# Patient Record
Sex: FEMALE | Race: WHITE | NOT HISPANIC OR LATINO | Employment: FULL TIME | URBAN - METROPOLITAN AREA
[De-identification: names, ages, dates, MRNs, and addresses within clinical notes are randomized per-mention and may not be internally consistent; named-entity substitution may affect disease eponyms.]

---

## 2017-08-01 ENCOUNTER — TELEPHONE (OUTPATIENT)
Dept: SURGERY | Facility: CLINIC | Age: 67
End: 2017-08-01

## 2017-08-01 NOTE — TELEPHONE ENCOUNTER
----- Message from Alfonso Alanis sent at 8/1/2017  4:17 PM CDT -----  [phone]//pt states that she needs to speak with nurse in ref to her prior mammo//please call/thank you

## 2017-08-01 NOTE — TELEPHONE ENCOUNTER
Returned the patient's call regarding the message below. All of the patient's questions were answered.  The patient stated she will call back to schedule her breast exam & mammogram appointments.

## 2017-08-07 DIAGNOSIS — N60.19 FIBROCYSTIC BREAST CHANGES, UNSPECIFIED LATERALITY: Primary | ICD-10-CM

## 2017-11-20 ENCOUNTER — OFFICE VISIT (OUTPATIENT)
Dept: SURGERY | Facility: CLINIC | Age: 67
End: 2017-11-20
Payer: COMMERCIAL

## 2017-11-20 ENCOUNTER — HOSPITAL ENCOUNTER (OUTPATIENT)
Dept: RADIOLOGY | Facility: HOSPITAL | Age: 67
Discharge: HOME | End: 2017-11-20
Attending: NURSE PRACTITIONER
Payer: COMMERCIAL

## 2017-11-20 VITALS
HEART RATE: 69 BPM | SYSTOLIC BLOOD PRESSURE: 173 MMHG | BODY MASS INDEX: 22.88 KG/M2 | DIASTOLIC BLOOD PRESSURE: 81 MMHG | HEIGHT: 64 IN | WEIGHT: 134 LBS | TEMPERATURE: 98 F

## 2017-11-20 DIAGNOSIS — B37.89 CANDIDIASIS OF BREAST: Primary | ICD-10-CM

## 2017-11-20 DIAGNOSIS — Z80.3 FAMILY HISTORY OF BREAST CANCER: ICD-10-CM

## 2017-11-20 DIAGNOSIS — N60.19 FIBROCYSTIC BREAST CHANGES, UNSPECIFIED LATERALITY: ICD-10-CM

## 2017-11-20 DIAGNOSIS — Z12.39 BREAST CANCER SCREENING, HIGH RISK PATIENT: ICD-10-CM

## 2017-11-20 PROCEDURE — 77062 BREAST TOMOSYNTHESIS BI: CPT | Mod: 26,,, | Performed by: RADIOLOGY

## 2017-11-20 PROCEDURE — 77066 DX MAMMO INCL CAD BI: CPT | Mod: TC

## 2017-11-20 PROCEDURE — 99212 OFFICE O/P EST SF 10 MIN: CPT | Mod: S$GLB,,, | Performed by: NURSE PRACTITIONER

## 2017-11-20 PROCEDURE — 77066 DX MAMMO INCL CAD BI: CPT | Mod: 26,,, | Performed by: RADIOLOGY

## 2017-11-20 PROCEDURE — 99999 PR PBB SHADOW E&M-EST. PATIENT-LVL III: CPT | Mod: PBBFAC,,, | Performed by: NURSE PRACTITIONER

## 2017-11-20 RX ORDER — NYSTATIN 100000 U/G
CREAM TOPICAL 2 TIMES DAILY
Qty: 30 G | Refills: 3 | Status: SHIPPED | OUTPATIENT
Start: 2017-11-20 | End: 2018-10-03

## 2017-11-20 NOTE — PROGRESS NOTES
Subjective:      Patient ID: Stephanie Mello is a 67 y.o. female.    Chief Complaint: Breast Cancer Screening      HPI: (PF, EPF - 1-3) (Detailed, Comp, - 4) returning patient presents for breast cancer screening, strong family history of breast cancer. Patient denies palpable breast mass, pain, nipple discharge, redness, increased warmth. Strong family history of breast cancer, suggestive of family clustering verses unknown genetic predisposition (negative genetic testing in affected relatives). She reports intermittent itchy rash under her breasts, primarily when it is warm outside    Last mmg 8/10/2016     Review of Systems  Objective:   Physical Exam   Pulmonary/Chest: She exhibits no mass, no tenderness, no laceration, no edema and no swelling. Right breast exhibits no inverted nipple, no mass, no nipple discharge, no skin change and no tenderness. Left breast exhibits no inverted nipple, no mass, no nipple discharge, no skin change and no tenderness.   Lymphadenopathy:     She has no cervical adenopathy.     She has no axillary adenopathy.        Right: No supraclavicular adenopathy present.        Left: No supraclavicular adenopathy present.     Assessment:       1. Candidiasis of breast    2. Family history of breast cancer    3. Breast cancer screening, high risk patient        Plan:       mmg today, no changes or abnormality reported   Return in 6 months CBE, also discussed MRI as additional screening, last MRI 2012, she will consider and let me know at 6 months whether she desires to proceed with this additional imaging   Call for any interval palpable breast mass, pain, nipple discharge, skin changes or other breast related concerns

## 2018-10-03 ENCOUNTER — OFFICE VISIT (OUTPATIENT)
Dept: URGENT CARE | Facility: CLINIC | Age: 68
End: 2018-10-03
Payer: MEDICARE

## 2018-10-03 VITALS
WEIGHT: 135 LBS | OXYGEN SATURATION: 99 % | TEMPERATURE: 100 F | RESPIRATION RATE: 18 BRPM | SYSTOLIC BLOOD PRESSURE: 153 MMHG | BODY MASS INDEX: 23.05 KG/M2 | HEART RATE: 78 BPM | DIASTOLIC BLOOD PRESSURE: 91 MMHG | HEIGHT: 64 IN

## 2018-10-03 DIAGNOSIS — L04.9 LYMPHADENITIS, ACUTE: Primary | ICD-10-CM

## 2018-10-03 PROCEDURE — 3077F SYST BP >= 140 MM HG: CPT | Mod: CPTII,S$GLB,, | Performed by: EMERGENCY MEDICINE

## 2018-10-03 PROCEDURE — 99203 OFFICE O/P NEW LOW 30 MIN: CPT | Mod: S$GLB,,, | Performed by: EMERGENCY MEDICINE

## 2018-10-03 PROCEDURE — 1101F PT FALLS ASSESS-DOCD LE1/YR: CPT | Mod: CPTII,S$GLB,, | Performed by: EMERGENCY MEDICINE

## 2018-10-03 PROCEDURE — 3080F DIAST BP >= 90 MM HG: CPT | Mod: CPTII,S$GLB,, | Performed by: EMERGENCY MEDICINE

## 2018-10-03 RX ORDER — AMOXICILLIN 500 MG/1
500 CAPSULE ORAL EVERY 12 HOURS
Qty: 14 CAPSULE | Refills: 0 | Status: SHIPPED | OUTPATIENT
Start: 2018-10-03 | End: 2018-10-10

## 2018-10-03 NOTE — PROGRESS NOTES
"Subjective:       Patient ID: Stephanie Mello is a 68 y.o. female.    Vitals:  height is 5' 4" (1.626 m) and weight is 61.2 kg (135 lb). Her oral temperature is 99.7 °F (37.6 °C). Her blood pressure is 153/91 (abnormal) and her pulse is 78. Her respiration is 18 and oxygen saturation is 99%.     Chief Complaint: Jaw Pain    Patient to clinic complaining of left sided jaw pain for 3 days. She rates her pain at 2 out of 10. She states the pain has gotten a little better but the swelling is worse. She also states she can not chew on her left side. Patient denies any previous injury of trauma.      Review of Systems   Constitution: Negative for chills and fever.   HENT: Negative for sore throat.    Eyes: Negative for blurred vision.   Cardiovascular: Negative for chest pain.   Respiratory: Negative for shortness of breath.    Skin: Negative for rash.   Musculoskeletal: Negative for back pain and joint pain.   Gastrointestinal: Negative for abdominal pain, diarrhea, nausea and vomiting.   Neurological: Negative for headaches.   Psychiatric/Behavioral: The patient is not nervous/anxious.        Objective:      Physical Exam   Constitutional: She is oriented to person, place, and time. She appears well-developed and well-nourished.   HENT:   Head: Normocephalic and atraumatic. Head is without abrasion, without contusion and without laceration.   Right Ear: External ear normal.   Left Ear: External ear normal.   Nose: Nose normal.   Mouth/Throat: Uvula is midline, oropharynx is clear and moist and mucous membranes are normal. There is trismus (mild) in the jaw.   Eyes: Conjunctivae, EOM and lids are normal. Pupils are equal, round, and reactive to light.   Neck: Trachea normal, full passive range of motion without pain and phonation normal. Neck supple.   Cardiovascular: Normal rate, regular rhythm and normal heart sounds.   Pulmonary/Chest: Effort normal and breath sounds normal. No stridor. No respiratory " distress.   Musculoskeletal: Normal range of motion.   Lymphadenopathy:        Head (left side): Submandibular adenopathy present.   Neurological: She is alert and oriented to person, place, and time.   Skin: Skin is warm, dry and intact. Capillary refill takes less than 2 seconds. No abrasion, no bruising, no burn, no ecchymosis, no laceration, no lesion and no rash noted. No erythema.   Psychiatric: She has a normal mood and affect. Her speech is normal and behavior is normal. Judgment and thought content normal. Cognition and memory are normal.   Nursing note and vitals reviewed.      Assessment:       1. Lymphadenitis, acute        Plan:         Lymphadenitis, acute  -     amoxicillin (AMOXIL) 500 MG capsule; Take 1 capsule (500 mg total) by mouth every 12 (twelve) hours. for 7 days  Dispense: 14 capsule; Refill: 0

## 2018-10-03 NOTE — PATIENT INSTRUCTIONS
Please return here or go to the Emergency Department for any concerns or worsening of condition.  If you were prescribed antibiotics, please take them to completion.  If you were prescribed a narcotic medication, do not drive or operate heavy equipment or machinery while taking these medications.  Please follow up with your primary care doctor or specialist as needed.  If you  smoke, please stop smoking.      Local Lymph Node Infection, Antibiotic Treatment    You have a bacterial infection of a lymph node. The lymph nodes are part of your immune system. They are found under the jaw and along the side of the neck, in the armpits, and in the groin. An infection or inflammation in nearby tissues causes the lymph nodes to swell and become tender.  When a bacterial infection occurs in the lymph node, it becomes very painful. The nearby skin gets red and warm. You may also have a fever.  Antibiotics and hot compresses are used to treat this infection. The pain and redness will get better over the next 7 to 10 days. Swelling may take several months to go away.  Sometimes an abscess (with pus) forms inside the lymph node. If this happens, antibiotics may not be enough to cure the infection. Minor surgery may be needed to drain the pus.  Home care  Follow these guidelines when caring for yourself at home:  · Take all of the antibiotic medicine exactly as prescribed until it is gone. Be careful not to miss any doses, especially during the first few days.  · Make a hot compress by running hot water over a face cloth. Apply it to the sore area until the cloth cools off. Repeat this for 20 minutes. Use the hot compress 3 times a day for the first 3 days, or until the pain and redness begin to get better. The heat will increase the blood flow to the area and speed the healing process.  · You may use acetaminophen or ibuprofen to control pain and fever, unless another medicine was prescribed for this. Dont use ibuprofen in  children under 6 months of age. If you have chronic liver or kidney disease, talk with your healthcare provider before using these medicines. Also talk with your provider if youve had a stomach ulcer or GI bleeding. Dont give aspirin to anyone under 18 years of age who is ill with a fever. It may cause severe liver damage.  Follow-up care  Follow up with your healthcare provider, or as advised, after you finish the antibiotics.  When to seek medical advice  Call your healthcare provider right away if any of these occur:  · Redness, swelling or pain in the lymph node gets worse  · The lymph node gets bigger, becomes soft in the middle, or doesnt seem to be getting better after youve taken the antibiotics for 2 days (48 hours)  · Pus or fluid drains from the lymph node  · You have difficulty breathing or swallowing  Also call your provider right away if you have a fever, or your childs provider if:  · Your child is younger than 12 weeks and has a fever of 100.4°F (38°C) or higher. Your baby may need to be seen by his or her healthcare provider.  · Your child is of any age and has repeated fevers above 104°F (40°C).  · Your child is younger than 2 years old and his or her fever continues for more than 24 hours. Or your child is 2 years old or older and his or her fever continues for more than 3 days.  Date Last Reviewed: 2/17/2015  © 2280-8978 [employer]. [address] [place] [address]. All rights reserved. This information is not intended as a substitute for professional medical care. Always follow your healthcare professional's instructions.

## 2018-12-03 ENCOUNTER — TELEPHONE (OUTPATIENT)
Dept: UROGYNECOLOGY | Facility: CLINIC | Age: 68
End: 2018-12-03

## 2018-12-03 ENCOUNTER — OFFICE VISIT (OUTPATIENT)
Dept: SURGERY | Facility: CLINIC | Age: 68
End: 2018-12-03
Payer: MEDICARE

## 2018-12-03 VITALS
HEART RATE: 66 BPM | DIASTOLIC BLOOD PRESSURE: 90 MMHG | WEIGHT: 136.81 LBS | HEIGHT: 64 IN | TEMPERATURE: 98 F | SYSTOLIC BLOOD PRESSURE: 166 MMHG | BODY MASS INDEX: 23.36 KG/M2

## 2018-12-03 DIAGNOSIS — Z80.3 FAMILY HISTORY OF BREAST CANCER: Primary | ICD-10-CM

## 2018-12-03 DIAGNOSIS — Z12.39 BREAST CANCER SCREENING, HIGH RISK PATIENT: ICD-10-CM

## 2018-12-03 PROCEDURE — 3077F SYST BP >= 140 MM HG: CPT | Mod: CPTII,S$GLB,, | Performed by: NURSE PRACTITIONER

## 2018-12-03 PROCEDURE — 99212 OFFICE O/P EST SF 10 MIN: CPT | Mod: S$GLB,,, | Performed by: NURSE PRACTITIONER

## 2018-12-03 PROCEDURE — 3080F DIAST BP >= 90 MM HG: CPT | Mod: CPTII,S$GLB,, | Performed by: NURSE PRACTITIONER

## 2018-12-03 PROCEDURE — 99999 PR PBB SHADOW E&M-EST. PATIENT-LVL IV: CPT | Mod: PBBFAC,,, | Performed by: NURSE PRACTITIONER

## 2018-12-03 PROCEDURE — 1101F PT FALLS ASSESS-DOCD LE1/YR: CPT | Mod: CPTII,S$GLB,, | Performed by: NURSE PRACTITIONER

## 2018-12-03 NOTE — PROGRESS NOTES
Subjective:      Patient ID: Stephanie Mello is a 68 y.o. female.    Chief Complaint: Breast Cancer Screening (CBE)      HPI: (PF, EPF - 1-3) (Detailed, Comp, - 4)returning patient presents for breast cancer screening. Patient denies palpable breast mass, pain, nipple discharge, redness, increased warmth.   Strong family history of breast cancer, suggestive of family clustering verses unknown genetic predisposition (negative genetic testing in affected relatives).      Last mmg 11- with no changes reported, breasts heterogeneously dense      Review of Systems  Objective:   Physical Exam   Pulmonary/Chest: Right breast exhibits no inverted nipple, no mass, no nipple discharge, no skin change and no tenderness. Left breast exhibits no inverted nipple, no mass, no nipple discharge, no skin change and no tenderness. Breasts are symmetrical. There is no breast swelling.   Lymphadenopathy:     She has no cervical adenopathy.     She has no axillary adenopathy.        Right: No supraclavicular adenopathy present.        Left: No supraclavicular adenopathy present.     Assessment:       1. Family history of breast cancer    2. Breast cancer screening, high risk patient        Plan:       Patient cannot have mmg today secondary to her schedule, will be scheduled and if no changes or abnormality she can return in one year to see Gallo Perez NP or another member of the breast team. Informed her I will be leaving [facility] next week.   She does not desire to have breast MRI as additional screening

## 2018-12-03 NOTE — TELEPHONE ENCOUNTER
What is the request in detail: requesting a call back in reference to seeing Dr Small in regards to a bulge, similar to a hemorhoid in her vaginal area.Pt would like to get a sooner appt than the 1/24 that I was able to schedule her for. Please call and advise             L/M stating we do not have available appts. Before 01/24/2019, she can call me back, if she has questions.

## 2019-01-24 ENCOUNTER — INITIAL CONSULT (OUTPATIENT)
Dept: UROGYNECOLOGY | Facility: CLINIC | Age: 69
End: 2019-01-24
Payer: MEDICARE

## 2019-01-24 VITALS
HEIGHT: 64 IN | SYSTOLIC BLOOD PRESSURE: 152 MMHG | BODY MASS INDEX: 24.28 KG/M2 | DIASTOLIC BLOOD PRESSURE: 80 MMHG | WEIGHT: 142.19 LBS

## 2019-01-24 DIAGNOSIS — N81.6 RECTOCELE, FEMALE: ICD-10-CM

## 2019-01-24 DIAGNOSIS — N81.9 VAGINAL VAULT PROLAPSE: ICD-10-CM

## 2019-01-24 DIAGNOSIS — N81.11 CYSTOCELE, MIDLINE: ICD-10-CM

## 2019-01-24 DIAGNOSIS — R30.0 DYSURIA: ICD-10-CM

## 2019-01-24 DIAGNOSIS — Z12.31 ENCOUNTER FOR SCREENING MAMMOGRAM FOR BREAST CANCER: Primary | ICD-10-CM

## 2019-01-24 PROCEDURE — 51701 INSERT BLADDER CATHETER: CPT | Mod: S$GLB,,, | Performed by: OBSTETRICS & GYNECOLOGY

## 2019-01-24 PROCEDURE — 99205 OFFICE O/P NEW HI 60 MIN: CPT | Mod: 25,S$GLB,, | Performed by: OBSTETRICS & GYNECOLOGY

## 2019-01-24 PROCEDURE — 99999 PR PBB SHADOW E&M-EST. PATIENT-LVL III: ICD-10-PCS | Mod: PBBFAC,,, | Performed by: OBSTETRICS & GYNECOLOGY

## 2019-01-24 PROCEDURE — 51701 PR INSERTION OF NON-INDWELLING BLADDER CATHETERIZATION FOR RESIDUAL UR: ICD-10-PCS | Mod: S$GLB,,, | Performed by: OBSTETRICS & GYNECOLOGY

## 2019-01-24 PROCEDURE — 1101F PT FALLS ASSESS-DOCD LE1/YR: CPT | Mod: CPTII,S$GLB,, | Performed by: OBSTETRICS & GYNECOLOGY

## 2019-01-24 PROCEDURE — 99205 PR OFFICE/OUTPT VISIT, NEW, LEVL V, 60-74 MIN: ICD-10-PCS | Mod: 25,S$GLB,, | Performed by: OBSTETRICS & GYNECOLOGY

## 2019-01-24 PROCEDURE — 87086 URINE CULTURE/COLONY COUNT: CPT

## 2019-01-24 PROCEDURE — 1101F PR PT FALLS ASSESS DOC 0-1 FALLS W/OUT INJ PAST YR: ICD-10-PCS | Mod: CPTII,S$GLB,, | Performed by: OBSTETRICS & GYNECOLOGY

## 2019-01-24 PROCEDURE — 99999 PR PBB SHADOW E&M-EST. PATIENT-LVL III: CPT | Mod: PBBFAC,,, | Performed by: OBSTETRICS & GYNECOLOGY

## 2019-01-24 NOTE — PROGRESS NOTES
[facility] [address] [place] [address]    Stephanie Mello  [id number]  1950    Consulting Physician: Jacki Parada FNP,*   GYN: none; Gabino in the past; PCP was doing paps  Primary M.D.: Jerrica Landa MD    Chief Complaint   Patient presents with    Vaginal Prolapse       HPI:     1)  UI:  (--) CUCA = (--) UUI.  (--) pads. Not getting up to urinate nightly.  Daytime frequency: Q 2 hours.  Nocturia: rarely:   (--) dysuria,  (--) hematuria,  (--) frequent UTIs.  (+) complete bladder emptying: rarely feels residual volume.    2)  POP:  Present. above introitus.  Symptoms:(--)  Discovered it about two months ago incidentally while bathing.  (--) vaginal bleeding. (--) vaginal discharge. (+) sexually active.  (--) dyspareunia.   (--)  Vaginal dryness.  (--) vaginal estrogen use.     3)  BM:  (--) constipation/straining.  (--) chronic diarrhea. (--) hematochezia.  (--) fecal incontinence.  (--) fecal smearing/urgency.  (+) complete evacuation.      Past Medical History  Past Medical History:   Diagnosis Date    Hyperlipidemia         Past Surgical History  Past Surgical History:   Procedure Laterality Date    APPENDECTOMY      HYSTERECTOMY      partial hysterectomy, then ovaries removed in     OOPHORECTOMY      TONSILLECTOMY      TUBAL LIGATION      xlap/pfannenstiel BSO (pelvic pain)    Hysterectomy: Yes -   Date: .  Indication: Chronic, heavy bleeding, was placed on provera but it was not helping.  Type: laparoscopic  Cervix present: No  Ovaries present: No--removed later through xlap  Other procedures at time of hysterectomy: Rectocele repair    Past Ob History     x N/A.  C/s x N/A.      Gynecologic History  LMP: No LMP recorded. Patient has had a hysterectomy.  Age of menarche: 13  Age of menopause: N/A (hysterectomy)  Menstrual history: regular timing, prolonged, heavy bleeding.  Pap test: N/A.   History of abnormal paps: No.  History of STIs:  No  Mammogram: Date of last: 11/17.  Result: Normal  Colonoscopy: Date of last: 2010.  Result:  Normal.  Repeat due:  2020.    DEXA:  Date of last: 2012.  Result:  Osteopenia. Declined meds.  Taking Ca/D.  Repeat due:  Not scheduled.     Family History  Family History   Problem Relation Age of Onset    Breast cancer Other 40    Breast cancer Mother 65    Breast cancer Sister 41    Breast cancer Sister 50    Ovarian cancer Neg Hx       Colon CA: No  Breast CA: Yes - Mother and two sisters  GYN CA: No   CA: No    Social History  Social History     Tobacco Use   Smoking Status Never Smoker   Smokeless Tobacco Never Used     Social History     Substance and Sexual Activity   Alcohol Use Yes    Alcohol/week: 1.5 oz    Types: 3 Standard drinks or equivalent per week   .    Social History     Substance and Sexual Activity   Drug Use No     The patient is .  Resides in [place] [address].  Employment status: retired.    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      Allergies  Review of patient's allergies indicates:   Allergen Reactions    Cephalosporins Diarrhea    Influenza virus vaccines Other (See Comments)     Allergic to thimerosal      Thimerosal Other (See Comments)     Eye irritation       Medications  Current Outpatient Medications on File Prior to Visit   Medication Sig Dispense Refill    CALCIUM-MAGNESIUM ORAL Take by mouth.      cholecalciferol, vitamin D3, (VITAMIN D3 ORAL) Take by mouth.      enzymes,digestive (DIGESTIVE ENZYMES ORAL) Take by mouth.      FOS/malto/polydextrose/Sorb 70 (FIBER FLOW ORAL) Take by mouth.      multivitamin (THERAGRAN) per tablet Take 1 tablet by mouth once daily.      omega-3/dha/epa/fish oil (OMEGA-3 ORAL) Take by mouth.      UNABLE TO FIND medication name: ADR Support      UNABLE TO FIND medication name: Thyroid Support      UNABLE TO FIND medication name: Aidan Stress      UNABLE TO FIND medication name: DHEA 15    "    No current facility-administered medications on file prior to visit.        Review of Systems A 14 point ROS was reviewed with pertinent positives as noted above in the history of present illness.      Constitutional: negative  Eyes: negative  Endocrine: negative  Gastrointestinal: negative  Cardiovascular: negative  Respiratory: negative  Allergic/Immunologic: negative  Integumentary: negative  Psychiatric: negative  Musculoskeletal: negative   Ear/Nose/Throat: negative  Neurologic: negative  Genitourinary: SEE HPI  Hematologic/Lymphatic: negative   Breast: negative    Urogynecologic Exam  BP (!) 152/80 (BP Location: Right arm, Patient Position: Sitting)   Ht 5' 4" (1.626 m)   Wt 64.5 kg (142 lb 3.2 oz)   BMI 24.41 kg/m²     GENERAL APPEARANCE:  The patient is well-developed, well-nourished.   Neck:  Supple with no thyromegaly, no carotid bruits.  Heart:  Regular rate and rhythm, no murmurs, rubs or gallops.  Lungs:  Clear.  No CVA tenderness.  Abdomen:  Soft, nontender, nondistended, no hepatosplenomegaly.  Incisions:  Pfannenstiel well-healed    PELVIC:    External genitalia:  Normal Bartholins, Skenes and labia bilaterally.    Urethra:  No caruncle, diverticulum or masses.  (+) hypermobility.    Vagina:  Atrophy (--) , no bladder masses or tender, no discharge.    Cervix:  absent  Uterus: uterus absent  Adnexa: Not palpable.    POP-Q:  Aa 0; Ba 0; C -4; Ap -1; Bp -1.  Genital hiatus 4, perineal body 2, total vaginal length 10.    NEUROLOGIC:  Cranial nerves 2 through 12 intact.  Strength 5/5.  DTRs 2+ lower extremities.  S2 through 4 normal.  Sacral reflexes intact.    EXT: LORA, 2+ pulses bilaterally, no C/C/E    COUGH STRESS TEST:  negative  KEGEL: unable to perform    RECTAL:    External:  Normal, (--) hemorrhoids, (--) dovetailing.   Internal:  deferred    PVR: 50 mL    Impression    1. Encounter for screening mammogram for breast cancer    2. Dysuria    3. Cystocele, midline    4. Rectocele, female  "   5. Vaginal vault prolapse        Initial Plan  The patient was counseled regarding these issues. The patient was given a summary sheet containing each of these issues with possible options for evaluation and management. When appropriate, we also reviewed computer-generated diagrams specific to their diagnoses..  All questions were addressed to the patient's satisfaction.    1) stage 2 cystocele/rectocele, stage 1 vaginal vault prolapse:  --observation vs PT vs pessary vs surgery (laparoscopic sacral colpopexy)  --PT for now--has made appt with PT PT in [facility]  --Empty bladder every 3 hours.  Empty well: wait a minute, lean forward on toilet.    --try to minimize excessive straining activities    2)  Please schedule mammogram.     3)  RTC 6 months.     Approximately 60 min were spent in consult, 90 % in discussion.     Thank you for requesting consultation of your patient.  I look forward to participating in their care.    Leslee Small  Female Pelvic Medicine and Reconstructive Surgery  [facility] [place], [place]

## 2019-01-24 NOTE — LETTER
January 27, 2019      DEANDRA Ness, AGN  [address]  [facility] [address]           [facility] [address] [place] [address]  Phone: [phone]          Patient: Stephanie Mello   MR Number: [id number]   YOB: 1950   Date of Visit: 1/24/2019       Dear Jacki Parada:    Thank you for referring Stephanie Mello to me for evaluation. Attached you will find relevant portions of my assessment and plan of care.    If you have questions, please do not hesitate to call me. I look forward to following Stephanie Mello along with you.    Sincerely,    Leslee Small MD    Enclosure  CC:  No Recipients    If you would like to receive this communication electronically, please contact [email or web address] or [phone] to request more information on Personal Link access.    For providers and/or their staff who would like to refer a patient to [facility], please contact us through our one-stop-shop provider referral line, [facility], at [phone].    If you feel you have received this communication in error or would no longer like to receive these types of communications, please e-mail [email or web address]

## 2019-01-24 NOTE — PATIENT INSTRUCTIONS
1) stage 2 cystocele/rectocele, stage 1 vaginal vault prolapse:  --observation vs PT vs pessary vs surgery (laparoscopic sacral colpopexy)  --PT for now  --Empty bladder every 3 hours.  Empty well: wait a minute, lean forward on toilet.    --try to minimize excessive straining activities    2)  Please schedule mammogram.     3)  RTC 6 months.

## 2019-01-25 LAB — BACTERIA UR CULT: NO GROWTH

## 2019-01-27 PROBLEM — N81.11 CYSTOCELE, MIDLINE: Status: ACTIVE | Noted: 2019-01-27

## 2019-01-27 PROBLEM — N81.6 RECTOCELE, FEMALE: Status: ACTIVE | Noted: 2019-01-27

## 2019-01-27 PROBLEM — N81.9 VAGINAL VAULT PROLAPSE: Status: ACTIVE | Noted: 2019-01-27

## 2019-01-28 ENCOUNTER — TELEPHONE (OUTPATIENT)
Dept: OBSTETRICS AND GYNECOLOGY | Facility: CLINIC | Age: 69
End: 2019-01-28

## 2019-01-28 NOTE — TELEPHONE ENCOUNTER
----- Message from Leslee Small MD sent at 1/26/2019  9:48 AM CST -----  Please let the patient know urine C&S was negative for infection.  Thanks!

## 2019-04-22 ENCOUNTER — HOSPITAL ENCOUNTER (OUTPATIENT)
Dept: RADIOLOGY | Facility: HOSPITAL | Age: 69
Discharge: HOME | End: 2019-04-22
Attending: OBSTETRICS & GYNECOLOGY
Payer: MEDICARE

## 2019-04-22 DIAGNOSIS — Z12.31 ENCOUNTER FOR SCREENING MAMMOGRAM FOR BREAST CANCER: ICD-10-CM

## 2019-04-22 PROCEDURE — 77067 SCR MAMMO BI INCL CAD: CPT | Mod: TC

## 2019-04-22 PROCEDURE — 77063 MAMMO DIGITAL SCREENING BILAT WITH TOMOSYNTHESIS_CAD: ICD-10-PCS | Mod: 26,,, | Performed by: RADIOLOGY

## 2019-04-22 PROCEDURE — 77067 SCR MAMMO BI INCL CAD: CPT | Mod: 26,,, | Performed by: RADIOLOGY

## 2019-04-22 PROCEDURE — 77067 MAMMO DIGITAL SCREENING BILAT WITH TOMOSYNTHESIS_CAD: ICD-10-PCS | Mod: 26,,, | Performed by: RADIOLOGY

## 2019-04-22 PROCEDURE — 77063 BREAST TOMOSYNTHESIS BI: CPT | Mod: 26,,, | Performed by: RADIOLOGY

## 2019-04-24 ENCOUNTER — TELEPHONE (OUTPATIENT)
Dept: UROGYNECOLOGY | Facility: CLINIC | Age: 69
End: 2019-04-24

## 2019-04-24 NOTE — TELEPHONE ENCOUNTER
----- Message from Leslee Small MD sent at 4/23/2019  8:09 PM CDT -----  Please let patient know mammogram is normal. Thanks!

## 2019-10-25 ENCOUNTER — TELEPHONE (OUTPATIENT)
Dept: UROGYNECOLOGY | Facility: CLINIC | Age: 69
End: 2019-10-25

## 2019-10-25 NOTE — TELEPHONE ENCOUNTER
Spoke to pt, she is requesting a referral to PT at [facility]. She states she making cash payments to her current PT, and was informed per a friend, [facility] takes her insurance. Pt states the referral can be faxed to [phone]. Please advise

## 2019-10-25 NOTE — TELEPHONE ENCOUNTER
----- Message from Shayne Mcfarland sent at 10/25/2019 12:45 PM CDT -----  Contact: Pt   Name of Who is Calling: JOSE ALBERTO SILVERIO [[id number]]    What is the request in detail: JOSE ALBERTO SILVERIO [[id number]] is requesting a referral be faxed over to [facility] for a pelvic therapy fax#[phone]......... Please contact to further discuss and advise      Can the clinic reply by [facility]: No     What Number to Call Back [facility]:  [phone]

## 2019-10-29 ENCOUNTER — TELEPHONE (OUTPATIENT)
Dept: UROGYNECOLOGY | Facility: CLINIC | Age: 69
End: 2019-10-29

## 2019-10-29 NOTE — TELEPHONE ENCOUNTER
----- Message from Josefa Jaimes sent at 10/29/2019 10:18 AM CDT -----  Contact: Self  Type:  Patient Returning Call    Who Called: JOSE ALBERTO SILVERIO [[id number]]    Who Left Message for Patient: Harvey VALLE    Does the patient know what this is regarding?: Yes, missed call from the clinic staff    Best Call Back Number: [phone]    Additional Information: None

## 2019-10-29 NOTE — TELEPHONE ENCOUNTER
----- Message from Marni Manning sent at 10/29/2019  8:22 AM CDT -----  Contact: pt  Name of Who is Calling: pt    What is the request in detail: is returning a call. Please contact to further discuss and advise      Can the clinic reply by [facility]: no    What [facility]: [phone]

## 2019-10-29 NOTE — TELEPHONE ENCOUNTER
Attempted to contact pt to help schedule FU with Prema after PT and inform her that her referral will be faxed to [facility]. Message to call the office was left with pt's .

## 2019-10-29 NOTE — TELEPHONE ENCOUNTER
----- Message from Leslee Small MD sent at 10/27/2019  8:14 PM CDT -----  Regarding: please help patient make 3-4 month follow up with Prema, then send PT Rx (attached)  Follow up is absolutely needed--my note says 6 months.  So, please remind her to look back at her discharge paperwork I gave her from her visit.      We can send PT Rx to [facility], but she needs to go ahead and make a 3-4 month follow up appt to see Prema to make sure things are getting better after she's done PT.    Thanks!    PT RX:  Please fill out signed Rx:    Dx:  stage 2 cystocele/rectocele, stage 1 vaginal vault prolapse, pelvic pressure  Rx:  Pelvic floor muscle strengthening    Please fax with copy of last clinic note & demographic information to:    Guillermo Riggins (full-out until Fall 2019), Reena Choudhary (full) ([facility]). (p) [phone] (f) [phone]. --Takes Medicaid.     Thanks!    ----- Message -----  From: Harvey Mcgrath MA  Sent: 10/25/2019   4:51 PM CDT  To: Leslee Small MD    Spoke to pt, she is requesting a referral to PT at [facility]. She states she making cash payments to her current PT, and was informed per a friend, [facility] takes her insurance. Pt states the referral can be faxed to [phone].     Pt was also informed she is due for her 6 month follow up and was offered to help schedule. Pt declined stating a follow up is not needed. Please advise.

## 2020-11-10 ENCOUNTER — OFFICE VISIT (OUTPATIENT)
Dept: SURGERY | Facility: CLINIC | Age: 70
End: 2020-11-10
Payer: MEDICARE

## 2020-11-10 VITALS
HEIGHT: 64 IN | SYSTOLIC BLOOD PRESSURE: 133 MMHG | BODY MASS INDEX: 24.1 KG/M2 | DIASTOLIC BLOOD PRESSURE: 70 MMHG | WEIGHT: 141.19 LBS | HEART RATE: 69 BPM

## 2020-11-10 DIAGNOSIS — N64.59 ABNORMAL BREAST EXAM: ICD-10-CM

## 2020-11-10 DIAGNOSIS — Z12.31 SCREENING MAMMOGRAM, ENCOUNTER FOR: Primary | ICD-10-CM

## 2020-11-10 DIAGNOSIS — N63.20 LEFT BREAST MASS: Primary | ICD-10-CM

## 2020-11-10 DIAGNOSIS — Z80.3 FAMILY HISTORY OF BREAST CANCER: ICD-10-CM

## 2020-11-10 PROCEDURE — 1159F PR MEDICATION LIST DOCUMENTED IN MEDICAL RECORD: ICD-10-PCS | Mod: S$GLB,,, | Performed by: PHYSICIAN ASSISTANT

## 2020-11-10 PROCEDURE — 3075F PR MOST RECENT SYSTOLIC BLOOD PRESS GE 130-139MM HG: ICD-10-PCS | Mod: CPTII,S$GLB,, | Performed by: PHYSICIAN ASSISTANT

## 2020-11-10 PROCEDURE — 1101F PT FALLS ASSESS-DOCD LE1/YR: CPT | Mod: CPTII,S$GLB,, | Performed by: PHYSICIAN ASSISTANT

## 2020-11-10 PROCEDURE — 3075F SYST BP GE 130 - 139MM HG: CPT | Mod: CPTII,S$GLB,, | Performed by: PHYSICIAN ASSISTANT

## 2020-11-10 PROCEDURE — 3008F PR BODY MASS INDEX (BMI) DOCUMENTED: ICD-10-PCS | Mod: CPTII,S$GLB,, | Performed by: PHYSICIAN ASSISTANT

## 2020-11-10 PROCEDURE — 1126F AMNT PAIN NOTED NONE PRSNT: CPT | Mod: S$GLB,,, | Performed by: PHYSICIAN ASSISTANT

## 2020-11-10 PROCEDURE — 3078F DIAST BP <80 MM HG: CPT | Mod: CPTII,S$GLB,, | Performed by: PHYSICIAN ASSISTANT

## 2020-11-10 PROCEDURE — 99999 PR PBB SHADOW E&M-EST. PATIENT-LVL III: CPT | Mod: PBBFAC,,, | Performed by: PHYSICIAN ASSISTANT

## 2020-11-10 PROCEDURE — 99213 OFFICE O/P EST LOW 20 MIN: CPT | Mod: S$GLB,,, | Performed by: PHYSICIAN ASSISTANT

## 2020-11-10 PROCEDURE — 3078F PR MOST RECENT DIASTOLIC BLOOD PRESSURE < 80 MM HG: ICD-10-PCS | Mod: CPTII,S$GLB,, | Performed by: PHYSICIAN ASSISTANT

## 2020-11-10 PROCEDURE — 99999 PR PBB SHADOW E&M-EST. PATIENT-LVL III: ICD-10-PCS | Mod: PBBFAC,,, | Performed by: PHYSICIAN ASSISTANT

## 2020-11-10 PROCEDURE — 3008F BODY MASS INDEX DOCD: CPT | Mod: CPTII,S$GLB,, | Performed by: PHYSICIAN ASSISTANT

## 2020-11-10 PROCEDURE — 1126F PR PAIN SEVERITY QUANTIFIED, NO PAIN PRESENT: ICD-10-PCS | Mod: S$GLB,,, | Performed by: PHYSICIAN ASSISTANT

## 2020-11-10 PROCEDURE — 99213 PR OFFICE/OUTPT VISIT, EST, LEVL III, 20-29 MIN: ICD-10-PCS | Mod: S$GLB,,, | Performed by: PHYSICIAN ASSISTANT

## 2020-11-10 PROCEDURE — 1101F PR PT FALLS ASSESS DOC 0-1 FALLS W/OUT INJ PAST YR: ICD-10-PCS | Mod: CPTII,S$GLB,, | Performed by: PHYSICIAN ASSISTANT

## 2020-11-10 PROCEDURE — 1159F MED LIST DOCD IN RCRD: CPT | Mod: S$GLB,,, | Performed by: PHYSICIAN ASSISTANT

## 2020-11-10 NOTE — PROGRESS NOTES
[facility]  [facility]  11/10/2020      SUBJECTIVE:   Ms. Stephanie Mello is a 70 y.o. female with a family history of breast cancer. who presents today for follow up breast cancer screening.    History of Present Illness: Patient was previously seen by NP Jacki Parada.   Strong family history of breast cancer, suggestive of family clustering verses unknown genetic predisposition (negative genetic testing in affected relatives).      She denies any nipple discharge or nipple inversion.  She denies palpating any breast masses bilaterally.     Her last bilateral screening mammogram was on 4/22/19 and read as BIRADS 1, negative.    Review of Systems: Denies any chest pain or shortness of breath.  Denies any fever or chills.  See HPI/ Interval History for other systems reviewed.    OBJECTIVE:   Vitals:    11/10/20 1459   BP: 133/70   Pulse: 69      Physical Exam:  HEENT: Normocephalic, atraumatic.    General: alert and oriented; no acute distress.  Breast: 1 cm mobile, tender lump at 6 o'clock left breast, N+3 cm. No right breast masses.  Normal color and contour of right and left breast.  No nipple inversion or discharge bilaterally.    Lymph: No palpable adjacent axillary lymph nodes.      ASSESSMENT:  Ms. Stephanie Mello is a 70 y.o. female with a family history of breast cancer. who presents today for follow up breast cancer screening.    PLAN:   We discussed that this lump detected on physical exam today is likely a benign breast cyst; however, I recommended imaging for further evaluation.   -Bilateral diagnostic mammogram and left breast ultrasound soon.  -Patient wishes to follow up at [facility] in 6 months with either MARK or breast surgeon for a clinical exam.     ~Valerie Ariza PA-C      Surgical Oncology            11/10/2020

## 2020-11-19 ENCOUNTER — HOSPITAL ENCOUNTER (OUTPATIENT)
Dept: RADIOLOGY | Facility: HOSPITAL | Age: 70
Discharge: HOME | End: 2020-11-19
Attending: PHYSICIAN ASSISTANT
Payer: MEDICARE

## 2020-11-19 DIAGNOSIS — N63.20 LEFT BREAST MASS: ICD-10-CM

## 2020-11-19 DIAGNOSIS — N64.59 ABNORMAL BREAST EXAM: ICD-10-CM

## 2020-11-19 PROCEDURE — 77062 BREAST TOMOSYNTHESIS BI: CPT | Mod: 26,,, | Performed by: RADIOLOGY

## 2020-11-19 PROCEDURE — 77062 MAMMO DIGITAL DIAGNOSTIC BILAT WITH TOMO: ICD-10-PCS | Mod: 26,,, | Performed by: RADIOLOGY

## 2020-11-19 PROCEDURE — 77062 BREAST TOMOSYNTHESIS BI: CPT | Mod: TC

## 2020-11-19 PROCEDURE — 76642 US BREAST LEFT LIMITED: ICD-10-PCS | Mod: 26,LT,, | Performed by: RADIOLOGY

## 2020-11-19 PROCEDURE — 76642 ULTRASOUND BREAST LIMITED: CPT | Mod: 26,LT,, | Performed by: RADIOLOGY

## 2020-11-19 PROCEDURE — 77066 DX MAMMO INCL CAD BI: CPT | Mod: 26,,, | Performed by: RADIOLOGY

## 2020-11-19 PROCEDURE — 76642 ULTRASOUND BREAST LIMITED: CPT | Mod: TC,LT

## 2020-11-19 PROCEDURE — 77066 MAMMO DIGITAL DIAGNOSTIC BILAT WITH TOMO: ICD-10-PCS | Mod: 26,,, | Performed by: RADIOLOGY

## 2022-04-27 ENCOUNTER — HOSPITAL ENCOUNTER (OUTPATIENT)
Dept: RADIOLOGY | Facility: HOSPITAL | Age: 72
Discharge: HOME | End: 2022-04-27
Attending: FAMILY MEDICINE
Payer: MEDICARE

## 2022-04-27 ENCOUNTER — OFFICE VISIT (OUTPATIENT)
Dept: SURGERY | Facility: CLINIC | Age: 72
End: 2022-04-27
Payer: MEDICARE

## 2022-04-27 VITALS
WEIGHT: 135 LBS | BODY MASS INDEX: 24.45 KG/M2 | DIASTOLIC BLOOD PRESSURE: 62 MMHG | HEIGHT: 63 IN | BODY MASS INDEX: 23.92 KG/M2 | HEART RATE: 64 BPM | WEIGHT: 138 LBS | SYSTOLIC BLOOD PRESSURE: 123 MMHG | HEIGHT: 63 IN

## 2022-04-27 DIAGNOSIS — Z12.31 SCREENING MAMMOGRAM, ENCOUNTER FOR: ICD-10-CM

## 2022-04-27 DIAGNOSIS — Z12.31 VISIT FOR SCREENING MAMMOGRAM: ICD-10-CM

## 2022-04-27 DIAGNOSIS — Z80.3 FAMILY HISTORY OF BREAST CANCER: Primary | ICD-10-CM

## 2022-04-27 PROCEDURE — 77067 SCR MAMMO BI INCL CAD: CPT | Mod: 26,,, | Performed by: RADIOLOGY

## 2022-04-27 PROCEDURE — 99999 PR PBB SHADOW E&M-EST. PATIENT-LVL III: ICD-10-PCS | Mod: PBBFAC,,, | Performed by: PHYSICIAN ASSISTANT

## 2022-04-27 PROCEDURE — 3074F PR MOST RECENT SYSTOLIC BLOOD PRESSURE < 130 MM HG: ICD-10-PCS | Mod: CPTII,S$GLB,, | Performed by: PHYSICIAN ASSISTANT

## 2022-04-27 PROCEDURE — 77067 MAMMO DIGITAL SCREENING BILAT WITH TOMO: ICD-10-PCS | Mod: 26,,, | Performed by: RADIOLOGY

## 2022-04-27 PROCEDURE — 1159F PR MEDICATION LIST DOCUMENTED IN MEDICAL RECORD: ICD-10-PCS | Mod: CPTII,S$GLB,, | Performed by: PHYSICIAN ASSISTANT

## 2022-04-27 PROCEDURE — 1101F PR PT FALLS ASSESS DOC 0-1 FALLS W/OUT INJ PAST YR: ICD-10-PCS | Mod: CPTII,S$GLB,, | Performed by: PHYSICIAN ASSISTANT

## 2022-04-27 PROCEDURE — 1125F AMNT PAIN NOTED PAIN PRSNT: CPT | Mod: CPTII,S$GLB,, | Performed by: PHYSICIAN ASSISTANT

## 2022-04-27 PROCEDURE — 1125F PR PAIN SEVERITY QUANTIFIED, PAIN PRESENT: ICD-10-PCS | Mod: CPTII,S$GLB,, | Performed by: PHYSICIAN ASSISTANT

## 2022-04-27 PROCEDURE — 3288F FALL RISK ASSESSMENT DOCD: CPT | Mod: CPTII,S$GLB,, | Performed by: PHYSICIAN ASSISTANT

## 2022-04-27 PROCEDURE — 3074F SYST BP LT 130 MM HG: CPT | Mod: CPTII,S$GLB,, | Performed by: PHYSICIAN ASSISTANT

## 2022-04-27 PROCEDURE — 77067 SCR MAMMO BI INCL CAD: CPT | Mod: TC

## 2022-04-27 PROCEDURE — 77063 MAMMO DIGITAL SCREENING BILAT WITH TOMO: ICD-10-PCS | Mod: 26,,, | Performed by: RADIOLOGY

## 2022-04-27 PROCEDURE — 77063 BREAST TOMOSYNTHESIS BI: CPT | Mod: TC

## 2022-04-27 PROCEDURE — 1160F RVW MEDS BY RX/DR IN RCRD: CPT | Mod: CPTII,S$GLB,, | Performed by: PHYSICIAN ASSISTANT

## 2022-04-27 PROCEDURE — 3078F PR MOST RECENT DIASTOLIC BLOOD PRESSURE < 80 MM HG: ICD-10-PCS | Mod: CPTII,S$GLB,, | Performed by: PHYSICIAN ASSISTANT

## 2022-04-27 PROCEDURE — 77063 BREAST TOMOSYNTHESIS BI: CPT | Mod: 26,,, | Performed by: RADIOLOGY

## 2022-04-27 PROCEDURE — 1159F MED LIST DOCD IN RCRD: CPT | Mod: CPTII,S$GLB,, | Performed by: PHYSICIAN ASSISTANT

## 2022-04-27 PROCEDURE — 1101F PT FALLS ASSESS-DOCD LE1/YR: CPT | Mod: CPTII,S$GLB,, | Performed by: PHYSICIAN ASSISTANT

## 2022-04-27 PROCEDURE — 3288F PR FALLS RISK ASSESSMENT DOCUMENTED: ICD-10-PCS | Mod: CPTII,S$GLB,, | Performed by: PHYSICIAN ASSISTANT

## 2022-04-27 PROCEDURE — 3008F PR BODY MASS INDEX (BMI) DOCUMENTED: ICD-10-PCS | Mod: CPTII,S$GLB,, | Performed by: PHYSICIAN ASSISTANT

## 2022-04-27 PROCEDURE — 99999 PR PBB SHADOW E&M-EST. PATIENT-LVL III: CPT | Mod: PBBFAC,,, | Performed by: PHYSICIAN ASSISTANT

## 2022-04-27 PROCEDURE — 99213 OFFICE O/P EST LOW 20 MIN: CPT | Mod: S$GLB,,, | Performed by: PHYSICIAN ASSISTANT

## 2022-04-27 PROCEDURE — 3078F DIAST BP <80 MM HG: CPT | Mod: CPTII,S$GLB,, | Performed by: PHYSICIAN ASSISTANT

## 2022-04-27 PROCEDURE — 1160F PR REVIEW ALL MEDS BY PRESCRIBER/CLIN PHARMACIST DOCUMENTED: ICD-10-PCS | Mod: CPTII,S$GLB,, | Performed by: PHYSICIAN ASSISTANT

## 2022-04-27 PROCEDURE — 99213 PR OFFICE/OUTPT VISIT, EST, LEVL III, 20-29 MIN: ICD-10-PCS | Mod: S$GLB,,, | Performed by: PHYSICIAN ASSISTANT

## 2022-04-27 PROCEDURE — 3008F BODY MASS INDEX DOCD: CPT | Mod: CPTII,S$GLB,, | Performed by: PHYSICIAN ASSISTANT

## 2022-04-27 NOTE — PROGRESS NOTES
"    [facility]  Department of Surgery  HIGH RISK      Referring provider:  No referring provider defined for this encounter.    PCP:  Jerrica Landa MD    Subjective:     Stephanie Mello is a 71 y.o. postmenopausal female   with a family history of breast cancer. who presents today for follow up breast cancer screening.    History of Present Illness:   Patient was previously seen by NP Jacki Parada.   Strong family history of breast cancer, suggestive of family clustering verses unknown genetic predisposition (negative genetic testing in affected relatives).      TC score at last calculation is no longer >20 %, but patient prefers to be followed at [facility] for CBE and mmg.     Interval History 4/27/22:   Patient states she has been doing well since she was last seen. Denies new complaints such as palpable masses, pain, skin changes or nipple discharge. Denies fever, chills, HA, SOB or CP.     Medical History is significant for the following:  Past Medical History:   Diagnosis Date    Hyperlipidemia        Family History is significant for the following:  Family History   Problem Relation Age of Onset    Breast cancer Mother 65    Breast cancer Sister 41    Breast cancer Sister 50    Breast cancer Other     Ovarian cancer Neg Hx        Social History:   Social History     Socioeconomic History    Marital status:    Tobacco Use    Smoking status: Never Smoker    Smokeless tobacco: Never Used   Substance and Sexual Activity    Alcohol use: Yes     Alcohol/week: 2.5 standard drinks     Types: 3 Standard drinks or equivalent per week    Drug use: No     Review of Systems: Denies any cough, chest pain or shortness of breath.  Denies any fever or chills.  See HPI/ Interval History for other systems reviewed.       Objective:   /62 (BP Location: Left arm, Patient Position: Sitting, BP Method: Medium (Automatic))   Pulse 64   Ht 5' 3" (1.6 m)   Wt 62.6 kg (138 lb)   BMI " 24.45 kg/m²     Physical Exam   Vitals reviewed.  Constitutional: She is oriented to person, place, and time. She appears well-developed and well-nourished. No distress.   HENT:   Head: Normocephalic and atraumatic.   Eyes: Pupils are equal, round, and reactive to light. Right eye exhibits no discharge. Left eye exhibits no discharge. No scleral icterus.   Neck: No tracheal deviation present.   Cardiovascular: Normal rate and regular rhythm.    Pulmonary/Chest: Effort normal and breath sounds normal. No respiratory distress. She exhibits no mass, no tenderness and no edema. Right breast exhibits no inverted nipple, no mass, no nipple discharge, no skin change and no tenderness. Left breast exhibits no inverted nipple, no mass, no nipple discharge, no skin change and no tenderness. Breasts are symmetrical.   Abdominal: Soft. She exhibits no mass. There is no abdominal tenderness.   Musculoskeletal: No edema.   Lymphadenopathy:     She has no cervical adenopathy.   Neurological: She is alert and oriented to person, place, and time.   Skin: Skin is warm and dry. No rash noted. She is not diaphoretic. No erythema.     Psychiatric: She has a normal mood and affect.       Imaging    4/27/2022 Bilateral Screening Mammo:   Findings:   This procedure was performed using tomosynthesis.   Computer-aided detection was utilized in the interpretation of this examination.     The breasts are heterogeneously dense, which may obscure small masses. There is no evidence of suspicious masses, microcalcifications or architectural distortion.     Impression:   No mammographic evidence of malignancy.     BI-RADS Category 1: Negative     Recommendation:  Routine screening mammogram in 1 year is recommended.       Assessment:     This is a 71 y.o. female who presents for routine screening mmg and CBE.       Plan:   1. CBE without concerning findings today. Patient reassured.   2. Long discussion about plan for follow up. Patient prefers to  be seen twice a year for CBE with one of those visits being synced up with her mammogram.   3. Will see her back in 6 months.   4. Patient verbalized understanding of plan. All questions asked and answered. Advised to call our office with any new or worsening sxs.       Zahra Fitzgerald PA-C  Breast Surgery

## 2022-09-06 ENCOUNTER — OFFICE VISIT (OUTPATIENT)
Dept: UROGYNECOLOGY | Facility: CLINIC | Age: 72
End: 2022-09-06
Payer: MEDICARE

## 2022-09-06 VITALS
DIASTOLIC BLOOD PRESSURE: 72 MMHG | WEIGHT: 140.63 LBS | SYSTOLIC BLOOD PRESSURE: 118 MMHG | BODY MASS INDEX: 24.92 KG/M2 | HEIGHT: 63 IN

## 2022-09-06 DIAGNOSIS — N81.6 RECTOCELE: ICD-10-CM

## 2022-09-06 DIAGNOSIS — N81.11 MIDLINE CYSTOCELE: Primary | ICD-10-CM

## 2022-09-06 DIAGNOSIS — N99.3 VAGINAL VAULT PROLAPSE, POSTHYSTERECTOMY: ICD-10-CM

## 2022-09-06 DIAGNOSIS — R19.8 IRREGULAR BOWEL HABITS: ICD-10-CM

## 2022-09-06 DIAGNOSIS — M85.80 OSTEOPENIA, UNSPECIFIED LOCATION: ICD-10-CM

## 2022-09-06 PROCEDURE — 3074F SYST BP LT 130 MM HG: CPT | Mod: CPTII,S$GLB,, | Performed by: OBSTETRICS & GYNECOLOGY

## 2022-09-06 PROCEDURE — 99999 PR PBB SHADOW E&M-EST. PATIENT-LVL IV: CPT | Mod: PBBFAC,,, | Performed by: OBSTETRICS & GYNECOLOGY

## 2022-09-06 PROCEDURE — 1159F MED LIST DOCD IN RCRD: CPT | Mod: CPTII,S$GLB,, | Performed by: OBSTETRICS & GYNECOLOGY

## 2022-09-06 PROCEDURE — 1126F PR PAIN SEVERITY QUANTIFIED, NO PAIN PRESENT: ICD-10-PCS | Mod: CPTII,S$GLB,, | Performed by: OBSTETRICS & GYNECOLOGY

## 2022-09-06 PROCEDURE — 1160F PR REVIEW ALL MEDS BY PRESCRIBER/CLIN PHARMACIST DOCUMENTED: ICD-10-PCS | Mod: CPTII,S$GLB,, | Performed by: OBSTETRICS & GYNECOLOGY

## 2022-09-06 PROCEDURE — 3288F PR FALLS RISK ASSESSMENT DOCUMENTED: ICD-10-PCS | Mod: CPTII,S$GLB,, | Performed by: OBSTETRICS & GYNECOLOGY

## 2022-09-06 PROCEDURE — 3078F PR MOST RECENT DIASTOLIC BLOOD PRESSURE < 80 MM HG: ICD-10-PCS | Mod: CPTII,S$GLB,, | Performed by: OBSTETRICS & GYNECOLOGY

## 2022-09-06 PROCEDURE — 1160F RVW MEDS BY RX/DR IN RCRD: CPT | Mod: CPTII,S$GLB,, | Performed by: OBSTETRICS & GYNECOLOGY

## 2022-09-06 PROCEDURE — 1126F AMNT PAIN NOTED NONE PRSNT: CPT | Mod: CPTII,S$GLB,, | Performed by: OBSTETRICS & GYNECOLOGY

## 2022-09-06 PROCEDURE — 99999 PR PBB SHADOW E&M-EST. PATIENT-LVL IV: ICD-10-PCS | Mod: PBBFAC,,, | Performed by: OBSTETRICS & GYNECOLOGY

## 2022-09-06 PROCEDURE — 1159F PR MEDICATION LIST DOCUMENTED IN MEDICAL RECORD: ICD-10-PCS | Mod: CPTII,S$GLB,, | Performed by: OBSTETRICS & GYNECOLOGY

## 2022-09-06 PROCEDURE — 1101F PR PT FALLS ASSESS DOC 0-1 FALLS W/OUT INJ PAST YR: ICD-10-PCS | Mod: CPTII,S$GLB,, | Performed by: OBSTETRICS & GYNECOLOGY

## 2022-09-06 PROCEDURE — 3008F PR BODY MASS INDEX (BMI) DOCUMENTED: ICD-10-PCS | Mod: CPTII,S$GLB,, | Performed by: OBSTETRICS & GYNECOLOGY

## 2022-09-06 PROCEDURE — 3008F BODY MASS INDEX DOCD: CPT | Mod: CPTII,S$GLB,, | Performed by: OBSTETRICS & GYNECOLOGY

## 2022-09-06 PROCEDURE — 1101F PT FALLS ASSESS-DOCD LE1/YR: CPT | Mod: CPTII,S$GLB,, | Performed by: OBSTETRICS & GYNECOLOGY

## 2022-09-06 PROCEDURE — 3074F PR MOST RECENT SYSTOLIC BLOOD PRESSURE < 130 MM HG: ICD-10-PCS | Mod: CPTII,S$GLB,, | Performed by: OBSTETRICS & GYNECOLOGY

## 2022-09-06 PROCEDURE — 99203 OFFICE O/P NEW LOW 30 MIN: CPT | Mod: S$GLB,,, | Performed by: OBSTETRICS & GYNECOLOGY

## 2022-09-06 PROCEDURE — 3078F DIAST BP <80 MM HG: CPT | Mod: CPTII,S$GLB,, | Performed by: OBSTETRICS & GYNECOLOGY

## 2022-09-06 PROCEDURE — 99203 PR OFFICE/OUTPT VISIT, NEW, LEVL III, 30-44 MIN: ICD-10-PCS | Mod: S$GLB,,, | Performed by: OBSTETRICS & GYNECOLOGY

## 2022-09-06 PROCEDURE — 3288F FALL RISK ASSESSMENT DOCD: CPT | Mod: CPTII,S$GLB,, | Performed by: OBSTETRICS & GYNECOLOGY

## 2022-09-06 NOTE — PROGRESS NOTES
Urogyn follow up  2022    [facility] [place]  [address]  [facility] [id number]    Stephanie Mello  [id number]  1950      Stephanie Mello is a 72 y.o. here for a urogyn follow up. The patient's last visit with me was on 2019. Therefore, she is a new patient to the practice since time elapsed from last visit is > 3 years.     1)  UI:  (--) CUCA = (--) UUI.  (--) pads. Not getting up to urinate nightly.  Daytime frequency: Q 2 hours.  Nocturia: rarely:   (--) dysuria,  (--) hematuria,  (--) frequent UTIs.  (+) complete bladder emptying: rarely feels residual volume.    2)  POP:  Present. above introitus.  Symptoms:(--)  Discovered it about two months ago incidentally while bathing.  (--) vaginal bleeding. (--) vaginal discharge. (+) sexually active.  (--) dyspareunia.   (--)  Vaginal dryness.  (--) vaginal estrogen use.   --POP-Q:  Aa 0; Ba 0; C -4; Ap -1; Bp -1.  Genital hiatus 4, perineal body 2, total vaginal length 10.    3)  BM:  (--) constipation/straining.  (--) chronic diarrhea. (--) hematochezia.  (--) fecal incontinence.  (--) fecal smearing/urgency.  (+) complete evacuation.      Past Medical History  Past Medical History:   Diagnosis Date    Hyperlipidemia         Past Surgical History  Past Surgical History:   Procedure Laterality Date    APPENDECTOMY      BREAST CYST ASPIRATION Left     ()    BREAST CYST ASPIRATION Left     2015    HYSTERECTOMY      partial hysterectomy, then ovaries removed in     OOPHORECTOMY      TONSILLECTOMY      TUBAL LIGATION      xlap/pfannenstiel BSO (pelvic pain)    Hysterectomy: Yes -   Date: .  Indication: Chronic, heavy bleeding, was placed on provera but it was not helping.  Type: laparoscopic  Cervix present: No  Ovaries present: No--removed later through xlap  Other procedures at time of hysterectomy: Rectocele repair    Past Ob History     x N/A.  C/s x N/A.      Gynecologic History  LMP: No LMP  "recorded. Patient has had a hysterectomy.  Age of menarche: 13  Age of menopause: N/A (hysterectomy)  Menstrual history: regular timing, prolonged, heavy bleeding.  Pap test: N/A.  History of abnormal paps: No.  History of STIs:  No  Mammogram: Date of last: 3/2022.  Result: Normal  Colonoscopy: Date of last: 2022 ([facility]).  Result:  Normal.  Repeat due:  2032.  DEXA:  Date of last: 2012.  Result:  Osteopenia. Declined meds.  Taking Ca/D.  Repeat due:  Not scheduled.     Family History  Family History   Problem Relation Age of Onset    Breast cancer Mother 65    Breast cancer Sister 41    Breast cancer Sister 50    Breast cancer Other     Ovarian cancer Neg Hx       Colon CA: No  Breast CA: Yes - Mother and two sisters  GYN CA: No   CA: No    Social History  Social History     Tobacco Use   Smoking Status Never   Smokeless Tobacco Never     Social History     Substance and Sexual Activity   Alcohol Use Yes    Alcohol/week: 2.5 standard drinks    Types: 3 Standard drinks or equivalent per week   .    Social History     Substance and Sexual Activity   Drug Use No     The patient is .  Resides in [place] [address].  Employment status: retired.    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      Issues include:  Patient Active Problem List   Diagnosis    Fibrocystic breast changes    Family history of breast cancer    Prolapse of female genital organs    Rectocele    Cystocele, midline    Vaginal vault prolapse, posthysterectomy    Irregular bowel habits    Osteopenia       History since last visit:    1) stage 2 cystocele/rectocele, stage 1 vaginal vault prolapse:  --did go to PT in past--helped but stopped due to COVID   --does these exercises intermittently  --currently working with biodynamic PT for back issues (working keeping PF "supple")  --no current s/sx POP/pressure   --only experiences bulge when passes hard BM  --no UI  --PVR 50 mL    2)  Constipation:  --does have to strain at times (less than " "1x/month)  --has mild hemorrhoids  --takes herbal supplement (fiberflow, Rx for dar BRO)    Medications:    Current Outpatient Medications:     CALCIUM-MAGNESIUM ORAL, Take by mouth., Disp: , Rfl:     cholecalciferol, vitamin D3, (VITAMIN D3 ORAL), Take by mouth., Disp: , Rfl:     enzymes,digestive (DIGESTIVE ENZYMES ORAL), Take by mouth., Disp: , Rfl:     FOS/malto/polydextrose/Sorb 70 (FIBER FLOW ORAL), Take by mouth., Disp: , Rfl:     multivitamin (THERAGRAN) per tablet, Take 1 tablet by mouth once daily., Disp: , Rfl:     omega-3/dha/epa/fish oil (OMEGA-3 ORAL), Take by mouth., Disp: , Rfl:       ROS:  As per HPI.      Exam  /72 (BP Location: Left arm, Patient Position: Sitting, BP Method: Medium (Manual))   Ht 5' 3" (1.6 m)   Wt 63.8 kg (140 lb 9.6 oz)   BMI 24.91 kg/m²   General: alert and oriented, no acute distress  Respiratory: normal respiratory effort  Abd: soft, non-tender, non-distended    Pelvic  Ext. Genitalia: normal external genitalia. Normal bartholins and skenes glands  Vagina: + atrophy. Normal vaginal mucosa without lesions. No discharge noted.   Non-tender bladder base without palpable mass.  Cervix: absent  Uterus:  surgically absent, vaginal cuff well healed   Urethra: no masses or tenderness  Urethral meatus: no lesions, caruncle or prolapse.    --POP-Q:  Aa 0; Ba 0; C -4; Ap -1; Bp -1.  Genital hiatus 4, perineal body 2, total vaginal length 10.    Impression  1. Midline cystocele  Ambulatory referral/consult to Physical/Occupational Therapy      2. Rectocele  Ambulatory referral/consult to Physical/Occupational Therapy      3. Vaginal vault prolapse, posthysterectomy  Ambulatory referral/consult to Physical/Occupational Therapy      4. Irregular bowel habits        5. Osteopenia, unspecified location          We reviewed the above issues and discussed options for short-term versus long-term management of her problems.   Plan:   1) Stage 2 cystocele/rectocele, stage 1 " vaginal vault prolapse:  --observation vs PT vs pessary vs surgery (laparoscopic sacral colpopexy)  --restart pelvic floor PT. Call to make appt:  [facility] (all take Medicaid):  1)  [facility] (Ludy Chavez or other): [address] [place] [address], [place]   [address]. Patients can park in the [place] entrance and it is on the first floor. (p) [phone] (Nina Gonzalez, ). (f) [phone].    2)  [facility]: (p) [phone].   --Empty bladder every 3 hours.  Empty well: wait a minute, lean forward on toilet.    --try to minimize excessive straining activities    2)  Occasional straining:  --can make bulge bigger  --continue daily fiber flow supplement  --increase hydration (watch caffeine)  --consider Starting daily fiber.  Take 1 tsp of fiber powder (psyllium or other sugar-free powder).  Mix in 8 oz of water.  Take x 3-5 days.  Then, increase fiber by 1 tsp every 3-5 days until stool is easy to pass.  Stop and continue at that dose.   Do not exceed 6 tsps/day.  May also use over the counter stool softener 1-2 x/day.  AVOID laxatives.  --straining will keep hemorrhoids from flaring as much   --follow up with colon and rectal at     3)  Osteopenia:  --continue daily supplements  --discuss need for further screening with PCP    3)  RTC 1 year.     30 minutes were spent in face to face time with this patient  90 % of this time was spent in counseling and/or coordination of care     Leslee Small MD  [facility]  Division of Female Pelvic Medicine and Reconstructive Surgery  Department of Obstetrics & Gynecology

## 2022-09-06 NOTE — PATIENT INSTRUCTIONS
1) Stage 2 cystocele/rectocele, stage 1 vaginal vault prolapse:  --observation vs PT vs pessary vs surgery (laparoscopic sacral colpopexy)  --restart pelvic floor PT. Call to make appt:  [facility] (all take Medicaid):  1)  [facility] (Ludy Chavez or other): [address] [place] [address], [place] [address]. Patients can park in the [place] entrance and it is on the first floor. (p) [phone] (Nina Gonzalez, ). (f) [phone].    2)  [facility]: (p) [phone].   --Empty bladder every 3 hours.  Empty well: wait a minute, lean forward on toilet.    --try to minimize excessive straining activities    2)  Occasional straining:  --can make bulge bigger  --continue daily fiber flow supplement  --increase hydration (watch caffeine)  --consider Starting daily fiber.  Take 1 tsp of fiber powder (psyllium or other sugar-free powder).  Mix in 8 oz of water.  Take x 3-5 days.  Then, increase fiber by 1 tsp every 3-5 days until stool is easy to pass.  Stop and continue at that dose.   Do not exceed 6 tsps/day.  May also use over the counter stool softener 1-2 x/day.  AVOID laxatives.  --straining will keep hemorrhoids from flaring as much   --follow up with colon and rectal at     3)  Osteopenia:  --continue daily supplements  --discuss need for further screening with PCP    3)  RTC 1 year.

## 2022-10-19 ENCOUNTER — CLINICAL SUPPORT (OUTPATIENT)
Dept: REHABILITATION | Facility: HOSPITAL | Age: 72
End: 2022-10-19
Attending: OBSTETRICS & GYNECOLOGY
Payer: MEDICARE

## 2022-10-19 DIAGNOSIS — N81.11 MIDLINE CYSTOCELE: ICD-10-CM

## 2022-10-19 DIAGNOSIS — N99.3 VAGINAL VAULT PROLAPSE, POSTHYSTERECTOMY: ICD-10-CM

## 2022-10-19 DIAGNOSIS — M62.89 PELVIC FLOOR DYSFUNCTION: ICD-10-CM

## 2022-10-19 DIAGNOSIS — N81.6 RECTOCELE: ICD-10-CM

## 2022-10-19 PROCEDURE — 97110 THERAPEUTIC EXERCISES: CPT | Mod: PO

## 2022-10-19 PROCEDURE — 97162 PT EVAL MOD COMPLEX 30 MIN: CPT | Mod: PO

## 2022-10-19 NOTE — PROGRESS NOTES
[facility]  Pelvic Health Physical Therapy Initial Evaluation    Date: 10/19/2022   Name: Stephanie Mello  Clinic Number: [id number]  Therapy Diagnosis:   Encounter Diagnosis   Name Primary?    Pelvic floor dysfunction      Physician: Leslee Small MD    Physician Orders: PT Eval and Treat    Medical Diagnosis from Referral: Midline cystocele [N81.11], Rectocele [N81.6], Vaginal vault prolapse, posthysterectomy [N99.3]  Evaluation Date: 10/19/2022  Authorization Period Expiration: 2023  Plan of Care Expiration: 2023  Visit # / Visits authorized:     Time In: 10:05  Time Out: 11:00  Total Appointment Time (timed & untimed codes): 55 minutes    Precautions: universal    Subjective     Date of onset: about 3 years ago    History of current condition - Stephanie reports: no leakage, no nocturia.  No pressure sensations in the vagina.  If she lifts heavy objects, she will feel a bulge but doesn't lift anymore.  She felt a bulge more recently.      OB/GYN History: ; 3 vaginal deliveries; perineal lacerations  Sexually active? Yes  Pain with vaginal exams, intercourse or tampon use? No    Bladder/Bowel History: trouble emptying bladder completely  Frequency of urination:   Daytime: every 30 minutes when drinking; longer when limiting          Nighttime: none  Difficulty initiating urine stream: No  Urine stream: strong  Complete emptying: Yes- had a PVR with Dr. Small in the past- leans to the side to fully empty.    Bladder leakage: No  Urinary Urgency: No, Able to delay the urge for at least 30 minute(s).  Frequency of bowel movements: 1-2 times a day  Difficulty initiating BM: No  Quality/Shape of BM: Coleman Stool Chart 4  Does Patient Feel Empty after BM? Yes  Fiber Supplements or Laxative Use? Yes; FiberFlo keeps her regular  Colon leakage: No    Pain:  none     Medical History: Stephanie  has a past medical history of Hyperlipidemia.     Surgical History: Stephanie Sulaiman Mello   has a past surgical history that includes Tonsillectomy; Tubal ligation; Hysterectomy (1992); Appendectomy; Oophorectomy; Breast cyst aspiration (Left); and Breast cyst aspiration (Left).    Medications: Stephanie has a current medication list which includes the following prescription(s): calcium/magnesium, cholecalciferol (vitamin d3), enzymes,digestive, fos/malto/polydextrose/sorb 70, multivitamin, and omega-3/dha/epa/fish oil.    Allergies:   Review of patient's allergies indicates:   Allergen Reactions    Cephalosporins Diarrhea    Influenza virus vaccines Other (See Comments)     Allergic to thimerosal      Thimerosal Other (See Comments)     Eye irritation        Prior Therapy/Previous treatment included: pelvic PT at [facility] with Reena  Social History:  lives with their spouse    Current exercise: inconsistent- tries to remember to do Kegels.  Leg lifts, clams  Occupation: Pt is retired- takes care of the grandchildren  Prior Level of Function: could lift without vaginal pressure  Current Level of Function: cannot lift without vaginal bulge    Types of fluid intake: water, tea, and almond milk, zevia soda  Diet: tries to eat low carb, high protein   Habitus: well developed, well nourished  Abuse/Neglect: No     Pts goals: to prevent her prolapse from worsening    OBJECTIVE     See EMR under MEDIA for written consent provided 10/19/2022  Chaperone: declined    ORTHO SCREEN  Posture in sitting: WNL  Posture in standing: WNL  Pelvic alignment: no sign of deviations noted in supine     ABDOMINAL WALL ASSESSMENT  Abdominal strength: Rectus abdominus: 2/5     Transverse abdominus: weak but palpable contraction noted  Scarring: Csection scars mobile and non-painful  Diastasis: absent       VAGINAL PELVIC FLOOR EXAM    EXTERNAL ASSESSMENT  Introitus: gaping  Skin condition: WNL  Scarring: episiotomy scar noted   Sensation: WNL   Pain: none    Voluntary contraction: accessory muscle use  Voluntary relaxation:  nil  Involuntary contraction: bulge  Bearing down: accessory muscle use  Perineal descent: present      INTERNAL ASSESSMENT  Pain: none   Sensation: able to localized pressure appropriately   Vaginal vault: fragile   Muscle Bulk: atrophy   Muscle Power: 2/5 (tension change only)       Quality of contraction: decreased hold   Specificity: patient contracts: gluts   Coordination: tends to hold breath during PFM contraction   Prolapse check: Grade 3 cystocele and Grade 1 rectocele          Limitation/Restriction for FOTO PFDI Prolapse Survey    Therapist reviewed FOTO scores for Stephanie Mello on 10/19/2022.   FOTO documents entered into [employer] - see Media section.    Limitation Score: 4%       TREATMENT     Treatment Time In: 10:50  Treatment Time Out: 11:00  Total Treatment time (time-based codes) separate from Evaluation: 10 minutes      Therapeutic Exercise to develop strength, endurance, and core stabilization for 10 minutes including:  abdominal sets, glute sets, and adductor sets.  She was instructed to stop any exercise that aggravates her hemorrhoids    Patient Education provided:   general anatomy/physiology of urinary/ bowel  system, benefits of treatment, risks of treatment, and alternative methods of treatment were discussed with the pt. Additionally, anatomy/physiology of pelvic floor and posture/body mechanices were reviewed.     Home Exercises provided:  Written Home Exercises provided: yes.  Exercises were reviewed and Stephanie was able to demonstrate them prior to the end of the session.    Stephanie demonstrated good  understanding of the education provided.     See EMR under Patient Instructions for exercises provided 10/19/2022.    Assessment     Stephanie is a 72 y.o. female referred to outpatient Physical Therapy with a medical diagnosis of Midline cystocele [N81.11], Rectocele [N81.6], Vaginal vault prolapse, posthysterectomy [N99.3]. Pt presents with poor knowledge of body mechanics and posture,  perineal descent, decreased pelvic muscle strength, and decreased endurance of the pelvic muscles.       Pt prognosis is Good.   Pt will benefit from skilled outpatient Physical Therapy to address the deficits stated above and in the chart below, provide pt/family education, and to maximize pt's level of independence.     Plan of care discussed with patient: Yes  Pt's spiritual, cultural and educational needs considered and patient is agreeable to the plan of care and goals as stated below:     Anticipated Barriers for therapy: none    Medical Necessity is demonstrated by the following:    History  Co-morbidities and personal factors that may impact the plan of care Co-morbidities   prior abdominal surgery and prior pelvic surgery    Personal Factors  no deficits     moderate   Examination  Body structures and functions, activity limitations and participation restrictions that may impact the plan of care Body Regions/Systems/Functions:  poor knowledge of body mechanics and posture, perineal descent, decreased pelvic muscle strength, decreased endurance of the pelvic muscles, and poor quality of pelvic muscle contraction     Activity Limitations:  full bladder emptying and Participating in social activities and ADLs due to pelvic pressure    Participation Restrictions:  Pelvic pressure with ADLs.     Activity limitations:   Learning and applying knowledge  no deficits    General Tasks and Commands  no deficits    Communication  no deficits    Mobility  no deficits    Self care  no deficits    Domestic Life  no deficits    Interactions/Relationships  no deficits    Life Areas  no deficits    Community and Social Life  no deficits       moderate   Clinical Presentation evolving clinical presentation with changing clinical characteristics moderate   Decision Making/ Complexity Score: moderate       Goals:  Long Term Goals: 12 weeks   Pt will verbalize improved awareness of PFM activity as palpated by PT in order to  improve activity involvement with HEP.  Pt will be independent with double voiding techniques 100% of the time to ensure full bladder emptying and decrease pt's risk of infection.   Pt will report improved ability to engage pelvic/abdominal brace with < or = 2/10 pain for improved tolerance of functional transfers/mobility.   Pt will report improved ability to tolerate standing > or = 60 minutes without vaginal pressure for improved ability to complete ADL's.   Pt/family will be independent with HEP for continued self-management of symptoms.     Plan     Plan of care Certification: 10/19/2022 to 1/19/2023.    Outpatient Physical Therapy 1 times per 2 week(s)  for 12 weeks to include the following interventions: therapeutic exercises, neuromuscular re-education, manual therapy, patient/family education, and self care/home management    Latia Milan, PT, BCB-PMD

## 2022-10-19 NOTE — PATIENT INSTRUCTIONS
HOME EXERCISE PROGRAM: 10/19/2022    ADDUCTION    Sit or lie down with hips and knees bent with feet flat on the floor. Place a rolled up towel, ball, or pillow between your knees and press your knees together so that you squeeze the object firmly. Do 50% of a full contraction.    Hold for 5 seconds. Repeat 10 times. Do 2 set per day.      Abdominal set    In same position, draw in your lower belly as if zipping tight pants.  Do not let your pelvis move.  Do not hold breath.      Hold for 5 seconds. Repeat 10 times. Do 2 sets per day.       GLUTE SET SUPINE      Lie on your back and squeeze your buttocks together. Do not hold your breath.     Hold 5 seconds, Repeat 10 times, 2 Sets, once per day

## 2022-11-11 ENCOUNTER — CLINICAL SUPPORT (OUTPATIENT)
Dept: REHABILITATION | Facility: HOSPITAL | Age: 72
End: 2022-11-11
Attending: OBSTETRICS & GYNECOLOGY
Payer: MEDICARE

## 2022-11-11 DIAGNOSIS — M62.89 PELVIC FLOOR DYSFUNCTION: Primary | ICD-10-CM

## 2022-11-11 PROCEDURE — 97112 NEUROMUSCULAR REEDUCATION: CPT | Mod: PO

## 2022-11-11 NOTE — PROGRESS NOTES
"  Pelvic Health Physical Therapy   Treatment Note     Name: Stephanie Mello  Clinic Number: [id number]    Therapy Diagnosis:   Encounter Diagnosis   Name Primary?    Pelvic floor dysfunction Yes     Physician: Leslee Small MD    Visit Date: 11/11/2022  Physician Orders: PT Eval and Treat    Medical Diagnosis from Referral: Midline cystocele [N81.11], Rectocele [N81.6], Vaginal vault prolapse, posthysterectomy [N99.3]  Evaluation Date: 10/19/2022  Authorization Period Expiration: 12/31/2022  Plan of Care Expiration: 1/19/2023  Visit # / Visits authorized: 2/30  Cancelled Visits: 0  No Show Visits: 0    Time In: 11:05  Time Out: 12:00  Total Billable Time: 55 minutes    Precautions: Standard    Subjective     Pt reports: that she had sciatica flareups after doing the add sets and gluts sets, so she worked on the abdominal sets only.    She was compliant with home exercise program.  Response to previous treatment: sciatica flares  Functional change: none noted yet    Pain: 0/10      Objective     Stephanie participated in neuromuscular re-education activities to develop Coordination and Control for 55 minutes including:   Kegels with assist of SEMG, abdominal sets.  We worked in supine and sitting with external lead wires.  In supine, her baseline resting was noted to be slightly elevated (normalized in sitting), and her WR rise was minimal.  In TA sets her WR rise was low, with little/no overflow noted.  We then moved to sitting, and with cues to "lift your vagina" she was able to produce a measurable change in SEMG activity with complete and timely derecruitment.  She had questions about working out with a , and guidelines for pressure management were reviewed.      Home Exercises Provided and Patient Education Provided     Education provided:   - posture/body mechanices  Discussed progression of plan of care with patient; educated pt in activity modification; reviewed HEP with pt. Pt demonstrated and " verbalized understanding of all instruction and was provided with a handout of HEP (see Patient Instructions).    Written Home Exercises Provided: yes.  Exercises were reviewed and Stephanie was able to demonstrate them prior to the end of the session.  Stephanie demonstrated good  understanding of the education provided.     See EMR under Patient Instructions for exercises provided 11/11/2022.    Assessment     Pt performed well- will hopefully be able to build PF strength to progress to standing soon.    Stephanie Is progressing well towards her goals.   Pt prognosis is Good.     Pt will continue to benefit from skilled outpatient physical therapy to address the deficits listed in the problem list box on initial evaluation, provide pt/family education and to maximize pt's level of independence in the home and community environment.     Pt's spiritual, cultural and educational needs considered and pt agreeable to plan of care and goals.     Anticipated barriers to physical therapy: none    Goals: 12 weeks   Pt will verbalize improved awareness of PFM activity as palpated by PT in order to improve activity involvement with HEP.  Pt will be independent with double voiding techniques 100% of the time to ensure full bladder emptying and decrease pt's risk of infection.   Pt will report improved ability to engage pelvic/abdominal brace with < or = 2/10 pain for improved tolerance of functional transfers/mobility.   Pt will report improved ability to tolerate standing > or = 60 minutes without vaginal pressure for improved ability to complete ADL's.   Pt/family will be independent with HEP for continued self-management of symptoms.    Plan     Plan of care Certification: 10/19/2022 to 1/19/2023.     Outpatient Physical Therapy 1 times per 2 week(s)  for 12 weeks to include the following interventions: therapeutic exercises, neuromuscular re-education, manual therapy, patient/family education, and self care/home  management    Latia Milan, PT, BCB-PMD

## 2022-11-27 ENCOUNTER — PATIENT MESSAGE (OUTPATIENT)
Dept: REHABILITATION | Facility: HOSPITAL | Age: 72
End: 2022-11-27
Payer: MEDICARE

## 2023-03-08 ENCOUNTER — DOCUMENTATION ONLY (OUTPATIENT)
Dept: REHABILITATION | Facility: HOSPITAL | Age: 73
End: 2023-03-08
Payer: MEDICARE

## 2023-03-08 NOTE — PROGRESS NOTES
3/8/2023    Pt has not attended PT sessions since 11/11/2022 and will be discharged from PT services with goal status unknown.

## 2024-03-26 NOTE — PROGRESS NOTES
Urogyn follow up  2024    [facility] 4TH FLOOR  [address]  [facility] [address]    Stephanie Mello  [id number]  1950      Stephanie Mello is a 73 y.o. here for a urogyn follow up. The patient's last visit with me was on 2022.      1)  UI:  (--) CUCA = (--) UUI.  (--) pads. Not getting up to urinate nightly.  Daytime frequency: Q 2 hours.  Nocturia: rarely:   (--) dysuria,  (--) hematuria,  (--) frequent UTIs.  (+) complete bladder emptying: rarely feels residual volume.    2)  POP:  Present. above introitus.  Symptoms:(--)  Discovered it about two months ago incidentally while bathing.  (--) vaginal bleeding. (--) vaginal discharge. (+) sexually active.  (--) dyspareunia.   (--)  Vaginal dryness.  (--) vaginal estrogen use.   --POP-Q:  Aa 0; Ba 0; C -4; Ap -1; Bp -1.  Genital hiatus 4, perineal body 2, total vaginal length 10.    3)  BM:  (--) constipation/straining.  (--) chronic diarrhea. (--) hematochezia.  (--) fecal incontinence.  (--) fecal smearing/urgency.  (+) complete evacuation.      Past Medical History  Past Medical History:   Diagnosis Date    Hyperlipidemia         Past Surgical History  Past Surgical History:   Procedure Laterality Date    APPENDECTOMY      BREAST CYST ASPIRATION Left     ()    BREAST CYST ASPIRATION Left     2015    HYSTERECTOMY  1992    partial hysterectomy, then ovaries removed in     OOPHORECTOMY      TONSILLECTOMY      TUBAL LIGATION      xlap/pfannenstiel BSO (pelvic pain)    Hysterectomy: Yes -   Date: .  Indication: Chronic, heavy bleeding, was placed on provera but it was not helping.  Type: laparoscopic  Cervix present: No  Ovaries present: No--removed later through xlap  Other procedures at time of hysterectomy: Rectocele repair    Past Ob History     x N/A.  C/s x N/A.      Gynecologic History  LMP: No LMP recorded. Patient has had a hysterectomy.  Age of menarche: 13  Age of menopause: N/A  "(hysterectomy)  Menstrual history: regular timing, prolonged, heavy bleeding.  Pap test: N/A.  History of abnormal paps: No.  History of STIs:  No  Mammogram: Date of last: 2022.  Result: Normal  Colonoscopy: Date of last:  (D[facility] at [facility]).  Result:  Normal.  Repeat due:  .  DEXA:  Date of last: .  Result:  Osteopenia. Declined meds.  Taking Ca/D.  Repeat due:  Not scheduled. Declines.      Family History  Family History   Problem Relation Age of Onset    Breast cancer Mother 65    Breast cancer Sister 41    Breast cancer Sister 50    Breast cancer Other     Ovarian cancer Neg Hx       Colon CA: No  Breast CA: Yes - Mother (65 yo)  and two sisters (41-- from triple NEG, 40s--living).    --nieces were BRCA NEG; patient has not been tested  GYN CA: No   CA: No    Social History  Social History     Tobacco Use   Smoking Status Never   Smokeless Tobacco Never     Social History     Substance and Sexual Activity   Alcohol Use Yes    Alcohol/week: 2.5 standard drinks    Types: 3 Standard drinks or equivalent per week   .    Social History     Substance and Sexual Activity   Drug Use No     The patient is .  Resides in [place] [address].  Employment status: retired.    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      Issues include:  Patient Active Problem List   Diagnosis    Fibrocystic breast changes    Family history of breast cancer    Prolapse of female genital organs    Rectocele    Cystocele, midline    Vaginal vault prolapse, posthysterectomy    Irregular bowel habits    Osteopenia    Pelvic floor dysfunction       History since last visit:    1) stage 2 cystocele/rectocele, stage 1 vaginal vault prolapse:  --did go to PT in past--helped but stopped due to COVID   --does these exercises intermittently  --currently working with [employer] [facility] for back issues (working keeping PF "supple")  --no current s/sx POP/pressure   --only experiences bulge when passes hard BM  --no UI  --PVR 50 mL    2)  " Constipation:  --drinks daily protein shake with flax seed--helps control constipation  --has mild hemorrhoids -- no major flares  --takes herbal supplement (fiberflow, Rx for [facility])    Medications:    Current Outpatient Medications:     CALCIUM-MAGNESIUM ORAL, Take by mouth., Disp: , Rfl:     cholecalciferol, vitamin D3, (VITAMIN D3 ORAL), Take by mouth., Disp: , Rfl:     enzymes,digestive (DIGESTIVE ENZYMES ORAL), Take by mouth., Disp: , Rfl:     FOS/malto/polydextrose/Sorb 70 (FIBER FLOW ORAL), Take by mouth., Disp: , Rfl:     multivitamin (THERAGRAN) per tablet, Take 1 tablet by mouth once daily., Disp: , Rfl:     omega-3/dha/epa/fish oil (OMEGA-3 ORAL), Take by mouth., Disp: , Rfl:       ROS:  As per HPI.      Exam  /75 (BP Location: Right arm, Patient Position: Sitting, BP Method: Medium (Automatic))   Wt 63.2 kg (139 lb 5.3 oz)   BMI 24.68 kg/m²   General: alert and oriented, no acute distress  Respiratory: normal respiratory effort  Abd: soft, non-tender, non-distended    Pelvic  Ext. Genitalia: normal external genitalia. Normal bartholins and skenes glands  Vagina: + atrophy. Normal vaginal mucosa without lesions. No discharge noted.   Non-tender bladder base without palpable mass.  Cervix: absent  Uterus:  surgically absent, vaginal cuff well healed   Urethra: no masses or tenderness  Urethral meatus: no lesions, caruncle or prolapse.    --POP-Q:  Aa 0; Ba 0; C -4; Ap -1; Bp -1.  Genital hiatus 4, perineal body 2, total vaginal length 10.    Rectal: normal external, old hemorrhoids--non-thrombosed; internal NT, grossly heme NEG, no masses, resting tone normal    Impression  1. Breast cancer screening by mammogram  Mammo Digital Screening Bilat w/ [facility]      2. Family history of breast cancer  Ambulatory referral/consult to Genetics      3. Rectocele        4. Cystocele, midline        5. Vaginal vault prolapse, posthysterectomy        6. Pelvic floor dysfunction        7. Osteopenia,  unspecified location        8. Irregular bowel habits            We reviewed the above issues and discussed options for short-term versus long-term management of her problems.   Plan:   1) Stage 2 cystocele/rectocele, stage 1 vaginal vault prolapse:  --observation vs PT vs pessary vs surgery (laparoscopic sacral colpopexy)  --continue pelvic floor PT. Call to make appt:  --Empty bladder every 3 hours.  Empty well: wait a minute, lean forward on toilet.    --try to minimize excessive straining activities    2)  Occasional straining:  --can make bulge bigger  --continue daily fiber flow supplement  --increase hydration (watch caffeine)  --consider Starting daily fiber.  Take 1 tsp of fiber powder (psyllium or other sugar-free powder).  Mix in 8 oz of water.  Take x 3-5 days.  Then, increase fiber by 1 tsp every 3-5 days until stool is easy to pass.  Stop and continue at that dose.   Do not exceed 6 tsps/day.  May also use over the counter stool softener 1-2 x/day.  AVOID laxatives.  --straining will keep hemorrhoids from flaring as much   --follow up with colon and rectal at     3)  Osteopenia:  --continue daily supplements  --discuss need for further screening with PCP   --declines testing    3)  Breast cancer screening:  --schedule mammogram  --FH +breast CA: genetics testing   --do you need genetic testing or increased surveillance    4)  RTC 1 year.     30 minutes were spent in face to face time with this patient  90 % of this time was spent in counseling and/or coordination of care     Leslee Small MD  [facility]  Division of Female Pelvic Medicine and Reconstructive Surgery  Department of Obstetrics & Gynecology

## 2024-03-27 ENCOUNTER — OFFICE VISIT (OUTPATIENT)
Dept: UROGYNECOLOGY | Facility: CLINIC | Age: 74
End: 2024-03-27
Payer: MEDICARE

## 2024-03-27 VITALS
DIASTOLIC BLOOD PRESSURE: 75 MMHG | SYSTOLIC BLOOD PRESSURE: 134 MMHG | BODY MASS INDEX: 24.68 KG/M2 | WEIGHT: 139.31 LBS

## 2024-03-27 DIAGNOSIS — Z80.3 FAMILY HISTORY OF BREAST CANCER: ICD-10-CM

## 2024-03-27 DIAGNOSIS — N99.3 VAGINAL VAULT PROLAPSE, POSTHYSTERECTOMY: ICD-10-CM

## 2024-03-27 DIAGNOSIS — Z12.31 BREAST CANCER SCREENING BY MAMMOGRAM: Primary | ICD-10-CM

## 2024-03-27 DIAGNOSIS — N81.6 RECTOCELE: ICD-10-CM

## 2024-03-27 DIAGNOSIS — M85.80 OSTEOPENIA, UNSPECIFIED LOCATION: ICD-10-CM

## 2024-03-27 DIAGNOSIS — R19.8 IRREGULAR BOWEL HABITS: ICD-10-CM

## 2024-03-27 DIAGNOSIS — M62.89 PELVIC FLOOR DYSFUNCTION: ICD-10-CM

## 2024-03-27 DIAGNOSIS — N81.11 CYSTOCELE, MIDLINE: ICD-10-CM

## 2024-03-27 PROCEDURE — 99999 PR PBB SHADOW E&M-EST. PATIENT-LVL III: CPT | Mod: PBBFAC,,, | Performed by: OBSTETRICS & GYNECOLOGY

## 2024-03-27 PROCEDURE — 3078F DIAST BP <80 MM HG: CPT | Mod: CPTII,S$GLB,, | Performed by: OBSTETRICS & GYNECOLOGY

## 2024-03-27 PROCEDURE — 3075F SYST BP GE 130 - 139MM HG: CPT | Mod: CPTII,S$GLB,, | Performed by: OBSTETRICS & GYNECOLOGY

## 2024-03-27 PROCEDURE — 99214 OFFICE O/P EST MOD 30 MIN: CPT | Mod: S$GLB,,, | Performed by: OBSTETRICS & GYNECOLOGY

## 2024-03-27 PROCEDURE — 3008F BODY MASS INDEX DOCD: CPT | Mod: CPTII,S$GLB,, | Performed by: OBSTETRICS & GYNECOLOGY

## 2024-03-27 PROCEDURE — 1160F RVW MEDS BY RX/DR IN RCRD: CPT | Mod: CPTII,S$GLB,, | Performed by: OBSTETRICS & GYNECOLOGY

## 2024-03-27 PROCEDURE — 1159F MED LIST DOCD IN RCRD: CPT | Mod: CPTII,S$GLB,, | Performed by: OBSTETRICS & GYNECOLOGY

## 2024-03-27 NOTE — PATIENT INSTRUCTIONS
1) Stage 2 cystocele/rectocele, stage 1 vaginal vault prolapse:  --observation vs PT vs pessary vs surgery (laparoscopic sacral colpopexy)  --continue pelvic floor PT. Call to make appt:  --Empty bladder every 3 hours.  Empty well: wait a minute, lean forward on toilet.    --try to minimize excessive straining activities    2)  Occasional straining:  --can make bulge bigger  --continue daily fiber flow supplement  --increase hydration (watch caffeine)  --consider Starting daily fiber.  Take 1 tsp of fiber powder (psyllium or other sugar-free powder).  Mix in 8 oz of water.  Take x 3-5 days.  Then, increase fiber by 1 tsp every 3-5 days until stool is easy to pass.  Stop and continue at that dose.   Do not exceed 6 tsps/day.  May also use over the counter stool softener 1-2 x/day.  AVOID laxatives.  --straining will keep hemorrhoids from flaring as much   --follow up with colon and rectal at     3)  Osteopenia:  --continue daily supplements  --discuss need for further screening with PCP   --declines testing    3)  Breast cancer screening:  --schedule mammogram  --FH +breast CA: genetics testing   --do you need genetic testing or increased surveillance    4)  RTC 1 year.

## 2024-07-26 ENCOUNTER — HOSPITAL ENCOUNTER (OUTPATIENT)
Dept: RADIOLOGY | Facility: HOSPITAL | Age: 74
Discharge: HOME | End: 2024-07-26
Attending: OBSTETRICS & GYNECOLOGY
Payer: MEDICARE

## 2024-07-26 VITALS — HEIGHT: 63 IN | BODY MASS INDEX: 23.92 KG/M2 | WEIGHT: 135 LBS

## 2024-07-26 DIAGNOSIS — Z12.31 BREAST CANCER SCREENING BY MAMMOGRAM: ICD-10-CM

## 2024-07-26 PROCEDURE — 77067 SCR MAMMO BI INCL CAD: CPT | Mod: 26,,, | Performed by: RADIOLOGY

## 2024-07-26 PROCEDURE — 77067 SCR MAMMO BI INCL CAD: CPT | Mod: TC

## 2024-07-26 PROCEDURE — 77063 BREAST TOMOSYNTHESIS BI: CPT | Mod: 26,,, | Performed by: RADIOLOGY

## 2024-07-30 ENCOUNTER — TELEPHONE (OUTPATIENT)
Dept: UROGYNECOLOGY | Facility: CLINIC | Age: 74
End: 2024-07-30
Payer: MEDICARE

## 2024-07-30 NOTE — TELEPHONE ENCOUNTER
Called pt to relay Dr. Smlal's message. Pt verbalized understanding, call ended.      ----- Message from Leslee Small MD sent at 7/30/2024  2:40 PM CDT -----  Please let patient know mammogram looks ok. Repeat will be due in 1 year. Thanks!

## 2025-04-29 ENCOUNTER — TELEPHONE (OUTPATIENT)
Dept: UROGYNECOLOGY | Facility: CLINIC | Age: 75
End: 2025-04-29
Payer: MEDICARE

## 2025-04-29 NOTE — TELEPHONE ENCOUNTER
Lvm for pt to call back clinic. No appts till October. PT is EP. Needs to be seen.. can offer NP/PA appt     ----- Message from Timo sent at 4/29/2025 12:29 PM CDT -----  Regarding: Self [phone]  Type:  Sooner Appointment RequestPatient is requesting a sooner appointment.  Patient declined first available appointment listed as well as another facility and provider .  Patient will not accept being placed on the waitlist and is requesting a message be sent to doctor.Name of Caller:  Self When is the first available appointment? September 2025 Symptoms:  annual, swelling in lower pelvic area Would the patient rather a call back or a response via [facility]?  Call back Best Call Back Number:  [phone] Additional Information:  ----- Message -----  From: Timo Hatch  Sent: 4/29/2025  12:30 PM CDT  To: Staci MORLEY Staff  Subject: Self [phone]                                Type:  Sooner Appointment RequestPatient is requesting a sooner appointment.  Patient declined first available appointment listed as well as another facility and provider .  Patient will not accept being placed on the waitlist and is requesting a message be sent to doctor.Name of Caller:  Self When is the first available appointment? September 2025 Symptoms:  annual Would the patient rather a call back or a response via [facility]?  Call back Best Call Back Number:  [phone] Additional Information:

## 2025-05-22 ENCOUNTER — TELEPHONE (OUTPATIENT)
Dept: UROGYNECOLOGY | Facility: CLINIC | Age: 75
End: 2025-05-22
Payer: MEDICARE

## 2025-05-22 NOTE — TELEPHONE ENCOUNTER
----- Message from Grace sent at 5/22/2025 11:55 AM CDT -----  Patient would like to schedule appt. She states she has a scar that is now bulging and would like to get it checked. Please contact patient at [phone]. Patients states she has left previous message and has not hear from provider.

## 2025-05-22 NOTE — TELEPHONE ENCOUNTER
Patient complains of bulging in area of previous csection scar. Scheduled for 06/10/2025.  JUANY Powell'

## 2025-06-10 ENCOUNTER — OFFICE VISIT (OUTPATIENT)
Dept: UROGYNECOLOGY | Facility: CLINIC | Age: 75
End: 2025-06-10
Payer: MEDICARE

## 2025-06-10 VITALS
HEART RATE: 75 BPM | HEIGHT: 63 IN | DIASTOLIC BLOOD PRESSURE: 67 MMHG | BODY MASS INDEX: 23.83 KG/M2 | WEIGHT: 134.5 LBS | SYSTOLIC BLOOD PRESSURE: 121 MMHG

## 2025-06-10 DIAGNOSIS — Z12.31 SCREENING MAMMOGRAM FOR BREAST CANCER: ICD-10-CM

## 2025-06-10 DIAGNOSIS — N81.11 CYSTOCELE, MIDLINE: Primary | ICD-10-CM

## 2025-06-10 DIAGNOSIS — K43.9 ABDOMINAL WALL HERNIA: ICD-10-CM

## 2025-06-10 DIAGNOSIS — L02.419 AXILLARY ABSCESS: ICD-10-CM

## 2025-06-10 DIAGNOSIS — N81.6 RECTOCELE: ICD-10-CM

## 2025-06-10 DIAGNOSIS — N99.3 VAGINAL VAULT PROLAPSE, POSTHYSTERECTOMY: ICD-10-CM

## 2025-06-10 PROCEDURE — 99999 PR PBB SHADOW E&M-EST. PATIENT-LVL IV: CPT | Mod: PBBFAC,,, | Performed by: NURSE PRACTITIONER

## 2025-06-10 NOTE — PROGRESS NOTES
"  Urogyn follow up  07/10/2025  .  [facility] - UROGYNECOLOGY  [address] [address]    Stephanie Mello  [id number]  1950      Stephanie Mello is a 74 y.o.  here for a urogyn follow up for complaints of L lower abdominal swelling.    Last HPI from  9/6/2022.        1)  UI:  (--) CUCA = (--) UUI.  (--) pads. Not getting up to urinate nightly.  Daytime frequency: Q 2 hours.  Nocturia: rarely:   (--) dysuria,  (--) hematuria,  (--) frequent UTIs.  (+) complete bladder emptying: rarely feels residual volume.     2)  POP:  Present. above introitus.  Symptoms:(--)  Discovered it about two months ago incidentally while bathing.  (--) vaginal bleeding. (--) vaginal discharge. (+) sexually active.  (--) dyspareunia.   (--)  Vaginal dryness.  (--) vaginal estrogen use.   --POP-Q:  Aa 0; Ba 0; C -4; Ap -1; Bp -1.  Genital hiatus 4, perineal body 2, total vaginal length 10.     3)  BM:  (--) constipation/straining.  (--) chronic diarrhea. (--) hematochezia.  (--) fecal incontinence.  (--) fecal smearing/urgency.  (+) complete evacuation.      03/27/2024  1) stage 2 cystocele/rectocele, stage 1 vaginal vault prolapse:  --did go to PT in past--helped but stopped due to COVID              --does these exercises intermittently  --currently working with [employer] PT for back issues (working keeping PF "supple")  --no current s/sx POP/pressure              --only experiences bulge when passes hard BM  --no UI  --PVR 50 mL   --POP-Q:  Aa 0; Ba 0; C -4; Ap -1; Bp -1.  Genital hiatus 4, perineal body 2, total vaginal length 10.       2)  Constipation:  --drinks daily protein shake with flax seed--helps control constipation  --has mild hemorrhoids -- no major flares  --takes herbal supplement (fiberflow, Rx for [facility] MD)          Changes from last visit:  1) Stage 2 cystocele/rectocele, stage 1 vaginal vault prolapse:  --does not feel like it has worsened     2)  Occasional " straining:  --rare straining  --taking fiber supplement     3)  Osteopenia:  --taking calcium and vitamin D     4)  Breast cancer screening:  --only doing screening mammogram     5)complains of swelling in L side of pfannenstiel  --getting bigger    6)denies urinary symptoms    7) had drainage from R axialla x 1 week several months ago--concerned and would like imaging       Past Medical History:   Diagnosis Date    Hyperlipidemia        Past Surgical History:   Procedure Laterality Date    APPENDECTOMY      BREAST CYST ASPIRATION Left     (2002)    BREAST CYST ASPIRATION Left     2015    HYSTERECTOMY  1992    partial hysterectomy, then ovaries removed in 1995    OOPHORECTOMY      TONSILLECTOMY      TUBAL LIGATION         Family History   Problem Relation Name Age of Onset    Breast cancer Mother  65    Breast cancer Sister  41    Breast cancer Sister  50    Breast cancer Other Xenia     Ovarian cancer Neg Hx         Social History     Socioeconomic History    Marital status:    Tobacco Use    Smoking status: Never    Smokeless tobacco: Never   Substance and Sexual Activity    Alcohol use: Yes     Alcohol/week: 2.5 standard drinks of alcohol     Types: 3 Standard drinks or equivalent per week    Drug use: No     Social Drivers of Health     Food Insecurity: No Food Insecurity (3/26/2024)    Hunger Vital Sign     Worried About Running Out of Food in the Last Year: Never true     Ran Out of Food in the Last Year: Never true   Transportation Needs: No Transportation Needs (3/26/2024)    PRAPARE - Transportation     Lack of Transportation (Medical): No     Lack of Transportation (Non-Medical): No   Physical Activity: Insufficiently Active (3/26/2024)    Exercise Vital Sign     Days of Exercise per Week: 3 days     Minutes of Exercise per Session: 30 min   Stress: No Stress Concern Present (3/26/2024)    [employer] - Occupational Stress Questionnaire     Feeling of Stress : Only a  "little   Housing Stability: Low Risk  (3/26/2024)    Housing Stability Vital Sign     Unable to Pay for Housing in the Last Year: No     Number of Places Lived in the Last Year: 1     Unstable Housing in the Last Year: No       Current Medications[1]    Review of patient's allergies indicates:   Allergen Reactions    Cephalosporins Diarrhea    Influenza virus vaccines Other (See Comments)     Allergic to thimerosal      Thimerosal Other (See Comments)     Eye irritation       Well woman:  Pap test: N/A.  History of abnormal paps: No.  History of STIs:  No  Mammogram: Date of last: 4/2022.  Result: Normal  Colonoscopy: Date of last: 2022 (Darci at [facility]).  Result:  Normal.  Repeat due:  2032.  DEXA:  Date of last: 2012.  Result:  Osteopenia. Declined meds.  Taking Ca/D.  Repeat due:  Not scheduled. Declines.         ROS:  As per HPI.      Exam  /67 (BP Location: Left arm, Patient Position: Sitting)   Pulse 75   Ht 5' 3" (1.6 m)   Wt 61 kg (134 lb 7.7 oz)   BMI 23.82 kg/m²   General: alert and oriented, no acute distress  Respiratory: normal respiratory effort  Abd: soft, non-tender, non-distended.  Area of abdominal bulging approximately 1 inch above L side of pfannenstiel incision area approximately 4 inches x 2 inches-- ? Hernia     Pelvic  Ext. Genitalia: normal external genitalia. Normal bartholin's and skeens glands  Vagina: + atrophy. Normal vaginal mucosa without lesions. No discharge noted.   Non-tender bladder base without palpable mass.  Cervix: absent  Uterus:  absent   Urethra: no masses or tenderness  Urethral meatus: no lesions, caruncle or prolapse.    POP-Q:   Aa 0.0 ; Ba 0.5; C -6; Ap -1; Bp -1.  Genital hiatus 4, perineal body 2, total vaginal length 10.    Impression  1. Cystocele, midline        2. Vaginal vault prolapse, posthysterectomy        3. Rectocele        4. Axillary abscess  US Axilla Only (Breast Imaging) Right      5. Abdominal wall hernia        6. Screening mammogram " for breast cancer  Mammo Digital Screening Bilat w/ [facility] ([facility])    Ambulatory referral/consult to General Surgery        We reviewed the above issues and discussed options for short-term versus long-term management of her problems.   Plan:   1) Stage 2 cystocele/rectocele, stage 1 vaginal vault prolapse:  --observation vs PT vs pessary vs surgery (laparoscopic sacral colpopexy)  --continue pelvic floor PT. Call to make appt:  --Empty bladder every 3 hours.  Empty well: wait a minute, lean forward on toilet.    --try to minimize excessive straining activities  --use coconut oil twice weekly     2)  Occasional straining:  --can make bulge bigger  --continue daily fiber flow supplement  --increase hydration (watch caffeine)  --continue fiber supplement  --straining will keep hemorrhoids from flaring as much              --follow up with colon and rectal at [facility]     3)  Osteopenia:  --continue daily supplements  --discuss need for further screening with PCP              --declines testing     4)  Breast cancer screening:  --schedule mammogram       5)  ? Incisional hernia  --referral to general surgery    6)RTC 1 year for follow up     I spent a total of 30 minutes on the day of the visit.  This includes face to face time and non-face to face time preparing to see the patient (eg, review of tests), obtaining and/or reviewing separately obtained history, documenting clinical information in the electronic or other health record, independently interpreting results and communicating results to the patient/family/caregiver, or care coordinator.       Prema Gonzalez, FNP-BC  [facility]  Division of Female Pelvic Medicine and Reconstructive Surgery  Department of Obstetrics & Gynecology         [1]   Current Outpatient Medications   Medication Sig Dispense Refill    CALCIUM-MAGNESIUM ORAL Take by mouth.      cholecalciferol, vitamin D3, (VITAMIN D3 ORAL) Take by mouth.      enzymes,digestive (DIGESTIVE ENZYMES  ORAL) Take by mouth.      multivitamin (THERAGRAN) per tablet Take 1 tablet by mouth once daily.      omega-3/dha/epa/fish oil (OMEGA-3 ORAL) Take by mouth.      FOS/malto/polydextrose/Sorb 70 (FIBER FLOW ORAL) Take by mouth. (Patient not taking: Reported on 6/10/2025)       No current facility-administered medications for this visit.

## 2025-06-10 NOTE — PATIENT INSTRUCTIONS
1) Stage 2 cystocele/rectocele, stage 1 vaginal vault prolapse:  --observation vs PT vs pessary vs surgery (laparoscopic sacral colpopexy)  --continue pelvic floor PT. Call to make appt:  --Empty bladder every 3 hours.  Empty well: wait a minute, lean forward on toilet.    --try to minimize excessive straining activities  --use coconut oil twice weekly     2)  Occasional straining:  --can make bulge bigger  --continue daily fiber flow supplement  --increase hydration (watch caffeine)  --continue fiber supplement  --straining will keep hemorrhoids from flaring as much              --follow up with colon and rectal at      3)  Osteopenia:  --continue daily supplements  --discuss need for further screening with PCP              --declines testing     4)  Breast cancer screening:  --schedule mammogram  R axilla ultrasound       5)  ? Incisional hernia  --referral to general surgery    6)RTC 1 year for follow up

## 2025-06-19 ENCOUNTER — OFFICE VISIT (OUTPATIENT)
Dept: SURGERY | Facility: CLINIC | Age: 75
End: 2025-06-19
Payer: MEDICARE

## 2025-06-19 VITALS
DIASTOLIC BLOOD PRESSURE: 64 MMHG | HEART RATE: 70 BPM | HEIGHT: 63 IN | WEIGHT: 136 LBS | TEMPERATURE: 98 F | BODY MASS INDEX: 24.1 KG/M2 | SYSTOLIC BLOOD PRESSURE: 106 MMHG

## 2025-06-19 DIAGNOSIS — Z12.31 SCREENING MAMMOGRAM FOR BREAST CANCER: ICD-10-CM

## 2025-06-19 DIAGNOSIS — R19.00 MASS OF SOFT TISSUE OF ABDOMEN: Primary | ICD-10-CM

## 2025-06-19 PROCEDURE — 3288F FALL RISK ASSESSMENT DOCD: CPT | Mod: CPTII,S$GLB,, | Performed by: SURGERY

## 2025-06-19 PROCEDURE — 3078F DIAST BP <80 MM HG: CPT | Mod: CPTII,S$GLB,, | Performed by: SURGERY

## 2025-06-19 PROCEDURE — 1159F MED LIST DOCD IN RCRD: CPT | Mod: CPTII,S$GLB,, | Performed by: SURGERY

## 2025-06-19 PROCEDURE — 3074F SYST BP LT 130 MM HG: CPT | Mod: CPTII,S$GLB,, | Performed by: SURGERY

## 2025-06-19 PROCEDURE — 1126F AMNT PAIN NOTED NONE PRSNT: CPT | Mod: CPTII,S$GLB,, | Performed by: SURGERY

## 2025-06-19 PROCEDURE — 99204 OFFICE O/P NEW MOD 45 MIN: CPT | Mod: S$GLB,,, | Performed by: SURGERY

## 2025-06-19 PROCEDURE — 99999 PR PBB SHADOW E&M-EST. PATIENT-LVL III: CPT | Mod: PBBFAC,,, | Performed by: SURGERY

## 2025-06-19 PROCEDURE — 3008F BODY MASS INDEX DOCD: CPT | Mod: CPTII,S$GLB,, | Performed by: SURGERY

## 2025-06-19 PROCEDURE — 1101F PT FALLS ASSESS-DOCD LE1/YR: CPT | Mod: CPTII,S$GLB,, | Performed by: SURGERY

## 2025-06-19 NOTE — PROGRESS NOTES
[facility]  OUTPATIENT H&P        HPI: Stephanie Mello is a 74 y.o. female with abdominal wall asymmetry slightly above LLQ incision.  Pt denies symptoms.    I have reviewed the patient's chart including prior progress notes, procedures and testing.     ROS:   Review of Systems   All other systems reviewed and are negative.      PROBLEM LIST:  Problem List[1]      HISTORY  Past Medical History:   Diagnosis Date    Hyperlipidemia        Past Surgical History:   Procedure Laterality Date    APPENDECTOMY      BREAST CYST ASPIRATION Left     (2002)    BREAST CYST ASPIRATION Left     2015    HYSTERECTOMY  1992    partial hysterectomy, then ovaries removed in 1995    OOPHORECTOMY      TONSILLECTOMY      TUBAL LIGATION         Social History[2]    Family History   Problem Relation Name Age of Onset    Breast cancer Mother  65    Breast cancer Sister  41    Breast cancer Sister  50    Breast cancer Other Xenia     Ovarian cancer Neg Hx           MEDS:  Medications Ordered Prior to Encounter[3]    ALLERGIES:  Review of patient's allergies indicates:   Allergen Reactions    Cephalosporins Diarrhea    Influenza virus vaccines Other (See Comments)     Allergic to thimerosal      Thimerosal Other (See Comments)     Eye irritation         VITALS:  Vitals:    06/19/25 0939   BP: 106/64   Pulse: 70   Temp: 98.3 °F (36.8 °C)         PHYSICAL EXAM:  Physical Exam  Constitutional:       Appearance: Normal appearance.   HENT:      Head: Normocephalic and atraumatic.   Pulmonary:      Effort: Pulmonary effort is normal.   Abdominal:      Palpations: Abdomen is soft.      Hernia: No hernia is present.      Comments: No hernia on exam   Skin:     General: Skin is warm and dry.   Neurological:      Mental Status: She is oriented to person, place, and time.   Psychiatric:         Mood and Affect: Mood normal.         Behavior: Behavior normal.           LABS:  Lab Results   Component Value Date    WBC 6.89 03/08/2015     RBC 4.44 03/08/2015    HGB 13.7 03/08/2015    HCT 40.6 03/08/2015     03/08/2015     Lab Results   Component Value Date     03/08/2015     03/08/2015    K 3.8 03/08/2015     03/08/2015    CO2 24 03/08/2015    BUN 21 03/08/2015    CREATININE 0.7 03/08/2015    CALCIUM 10.3 03/08/2015     Lab Results   Component Value Date    ALT 21 03/08/2015    AST 26 03/08/2015    ALKPHOS 77 03/08/2015    BILITOT 0.5 03/08/2015     Lab Results   Component Value Date    MG 2.4 03/08/2015       STUDIES:  N/A images and reports were personally reviewed.        ASSESSMENT & PLAN:  74 y.o. female, will order U/S per pt's preference but I feel no hernia on exam.      Lexa Curry M.D., F.A.C.S.  [id number]  [facility]              [1]   Patient Active Problem List  Diagnosis    Fibrocystic breast changes    Family history of breast cancer    Prolapse of female genital organs    Rectocele    Cystocele, midline    Vaginal vault prolapse, posthysterectomy    Irregular bowel habits    Osteopenia    Pelvic floor dysfunction   [2]   Social History  Tobacco Use    Smoking status: Never    Smokeless tobacco: Never   Substance Use Topics    Alcohol use: Yes     Alcohol/week: 2.5 standard drinks of alcohol     Types: 3 Standard drinks or equivalent per week    Drug use: No   [3]   Current Outpatient Medications on File Prior to Visit   Medication Sig Dispense Refill    CALCIUM-MAGNESIUM ORAL Take by mouth.      cholecalciferol, vitamin D3, (VITAMIN D3 ORAL) Take by mouth.      enzymes,digestive (DIGESTIVE ENZYMES ORAL) Take by mouth.      FOS/malto/polydextrose/Sorb 70 (FIBER FLOW ORAL) Take by mouth.      multivitamin (THERAGRAN) per tablet Take 1 tablet by mouth once daily.      omega-3/dha/epa/fish oil (OMEGA-3 ORAL) Take by mouth.       No current facility-administered medications on file prior to visit.

## 2025-06-23 ENCOUNTER — HOSPITAL ENCOUNTER (OUTPATIENT)
Dept: RADIOLOGY | Facility: HOSPITAL | Age: 75
Discharge: HOME | End: 2025-06-23
Attending: SURGERY
Payer: MEDICARE

## 2025-06-23 DIAGNOSIS — R19.00 MASS OF SOFT TISSUE OF ABDOMEN: ICD-10-CM

## 2025-06-23 PROCEDURE — 76705 ECHO EXAM OF ABDOMEN: CPT | Mod: TC

## 2025-06-23 PROCEDURE — 76705 ECHO EXAM OF ABDOMEN: CPT | Mod: 26,,, | Performed by: RADIOLOGY

## 2025-06-26 ENCOUNTER — TELEPHONE (OUTPATIENT)
Dept: SURGERY | Facility: CLINIC | Age: 75
End: 2025-06-26
Payer: MEDICARE

## 2025-06-26 NOTE — TELEPHONE ENCOUNTER
Source   Stephanie Mello (Patient)    Subject   Stephanie Mello (Patient)    Topic   General Inquiry - Return Call      Summary   Return Call   Communication   Type:  Patient Returning Call            Who Called:pt      Who Left Message for Patient:office      Does the patient know what this is regarding?:returning call      Would the patient rather a call back or a response via [facility]? call      Best Call Back Number:[phone]      Additional Information:         06/26/2025                  [id number]  Spoke to patient regarding the above message. Let her know Dr. Curry is in a meeting. She stated she received test results on her portal. Advised Dr. Curry would contact her on Monday to review those results. Patient verbalized understanding.

## 2025-06-27 ENCOUNTER — RESULTS FOLLOW-UP (OUTPATIENT)
Dept: SURGERY | Facility: HOSPITAL | Age: 75
End: 2025-06-27

## 2025-06-30 ENCOUNTER — OFFICE VISIT (OUTPATIENT)
Dept: SURGERY | Facility: CLINIC | Age: 75
End: 2025-06-30
Payer: MEDICARE

## 2025-06-30 DIAGNOSIS — R19.00 MASS OF SOFT TISSUE OF ABDOMEN: Primary | ICD-10-CM

## 2025-06-30 PROCEDURE — 99499 UNLISTED E&M SERVICE: CPT | Mod: 93,,, | Performed by: SURGERY
